# Patient Record
Sex: FEMALE | Race: BLACK OR AFRICAN AMERICAN | Employment: FULL TIME | URBAN - METROPOLITAN AREA
[De-identification: names, ages, dates, MRNs, and addresses within clinical notes are randomized per-mention and may not be internally consistent; named-entity substitution may affect disease eponyms.]

---

## 2017-05-08 ENCOUNTER — OFFICE VISIT (OUTPATIENT)
Dept: ORTHOPEDIC SURGERY | Age: 57
End: 2017-05-08

## 2017-05-08 VITALS
WEIGHT: 254.41 LBS | BODY MASS INDEX: 42.39 KG/M2 | DIASTOLIC BLOOD PRESSURE: 84 MMHG | HEART RATE: 78 BPM | SYSTOLIC BLOOD PRESSURE: 133 MMHG | HEIGHT: 65 IN

## 2017-05-08 DIAGNOSIS — S93.402A MILD ANKLE SPRAIN, LEFT, INITIAL ENCOUNTER: ICD-10-CM

## 2017-05-08 DIAGNOSIS — M25.572 ACUTE LEFT ANKLE PAIN: Primary | ICD-10-CM

## 2017-05-08 DIAGNOSIS — G57.82 NERVE ENTRAPMENT OF LOWER LIMB, LEFT: ICD-10-CM

## 2017-05-08 PROBLEM — G57.80 NERVE ENTRAPMENT OF LOWER LIMB: Status: ACTIVE | Noted: 2017-05-08

## 2017-05-08 PROBLEM — S93.409A MILD ANKLE SPRAIN: Status: ACTIVE | Noted: 2017-05-08

## 2017-05-08 PROCEDURE — 20550 NJX 1 TENDON SHEATH/LIGAMENT: CPT | Performed by: PODIATRIST

## 2017-05-08 PROCEDURE — 73610 X-RAY EXAM OF ANKLE: CPT | Performed by: PODIATRIST

## 2017-05-08 PROCEDURE — 99203 OFFICE O/P NEW LOW 30 MIN: CPT | Performed by: PODIATRIST

## 2017-05-08 RX ORDER — HYDROCODONE BITARTRATE AND ACETAMINOPHEN 5; 325 MG/1; MG/1
TABLET ORAL
Refills: 0 | COMMUNITY
Start: 2017-02-08 | End: 2017-09-06

## 2017-05-08 RX ORDER — DEXAMETHASONE 4 MG/1
TABLET ORAL
Refills: 0 | COMMUNITY
Start: 2017-02-15 | End: 2017-09-06

## 2017-05-08 RX ORDER — AMOXICILLIN 500 MG/1
CAPSULE ORAL
Refills: 0 | COMMUNITY
Start: 2017-02-15 | End: 2017-05-08 | Stop reason: ALTCHOICE

## 2017-06-08 ENCOUNTER — OFFICE VISIT (OUTPATIENT)
Dept: ORTHOPEDIC SURGERY | Age: 57
End: 2017-06-08

## 2017-06-08 VITALS — BODY MASS INDEX: 43.36 KG/M2 | WEIGHT: 254 LBS | HEIGHT: 64 IN

## 2017-06-08 DIAGNOSIS — G89.29 CHRONIC PAIN OF LEFT ANKLE: Primary | ICD-10-CM

## 2017-06-08 DIAGNOSIS — S93.402D MILD ANKLE SPRAIN, LEFT, SUBSEQUENT ENCOUNTER: ICD-10-CM

## 2017-06-08 DIAGNOSIS — M25.572 CHRONIC PAIN OF LEFT ANKLE: Primary | ICD-10-CM

## 2017-06-08 PROCEDURE — L1902 AFO ANKLE GAUNTLET PRE OTS: HCPCS | Performed by: PODIATRIST

## 2017-06-08 PROCEDURE — 99213 OFFICE O/P EST LOW 20 MIN: CPT | Performed by: PODIATRIST

## 2017-07-13 ENCOUNTER — OFFICE VISIT (OUTPATIENT)
Dept: ORTHOPEDIC SURGERY | Age: 57
End: 2017-07-13

## 2017-07-13 VITALS
HEIGHT: 64 IN | WEIGHT: 253.97 LBS | BODY MASS INDEX: 43.36 KG/M2 | SYSTOLIC BLOOD PRESSURE: 132 MMHG | HEART RATE: 88 BPM | DIASTOLIC BLOOD PRESSURE: 85 MMHG

## 2017-07-13 DIAGNOSIS — M25.372 ANKLE INSTABILITY, LEFT: ICD-10-CM

## 2017-07-13 DIAGNOSIS — M25.572 ACUTE LEFT ANKLE PAIN: ICD-10-CM

## 2017-07-13 DIAGNOSIS — M76.72 PERONEAL TENDINITIS, LEFT: ICD-10-CM

## 2017-07-13 DIAGNOSIS — S93.402D MILD ANKLE SPRAIN, LEFT, SUBSEQUENT ENCOUNTER: Primary | ICD-10-CM

## 2017-07-13 PROBLEM — M76.70 PERONEAL TENDINITIS: Status: ACTIVE | Noted: 2017-07-13

## 2017-07-13 PROCEDURE — 99213 OFFICE O/P EST LOW 20 MIN: CPT | Performed by: PODIATRIST

## 2017-07-31 ENCOUNTER — OFFICE VISIT (OUTPATIENT)
Dept: ORTHOPEDIC SURGERY | Age: 57
End: 2017-07-31

## 2017-07-31 VITALS
WEIGHT: 253.97 LBS | BODY MASS INDEX: 43.36 KG/M2 | HEIGHT: 64 IN | SYSTOLIC BLOOD PRESSURE: 132 MMHG | DIASTOLIC BLOOD PRESSURE: 88 MMHG | HEART RATE: 78 BPM

## 2017-07-31 DIAGNOSIS — S86.312D PERONEAL TENDON TEAR, LEFT, SUBSEQUENT ENCOUNTER: Primary | ICD-10-CM

## 2017-07-31 PROBLEM — S86.319A PERONEAL TENDON TEAR: Status: ACTIVE | Noted: 2017-07-31

## 2017-07-31 PROCEDURE — 99213 OFFICE O/P EST LOW 20 MIN: CPT | Performed by: PODIATRIST

## 2017-08-24 ENCOUNTER — OFFICE VISIT (OUTPATIENT)
Dept: INTERNAL MEDICINE | Age: 57
End: 2017-08-24

## 2017-08-24 VITALS
DIASTOLIC BLOOD PRESSURE: 90 MMHG | WEIGHT: 267.6 LBS | HEIGHT: 64 IN | BODY MASS INDEX: 45.68 KG/M2 | SYSTOLIC BLOOD PRESSURE: 160 MMHG

## 2017-08-24 DIAGNOSIS — J01.00 ACUTE NON-RECURRENT MAXILLARY SINUSITIS: Primary | ICD-10-CM

## 2017-08-24 DIAGNOSIS — H10.9 CONJUNCTIVITIS OF BOTH EYES, UNSPECIFIED CONJUNCTIVITIS TYPE: ICD-10-CM

## 2017-08-24 PROCEDURE — 99213 OFFICE O/P EST LOW 20 MIN: CPT | Performed by: INTERNAL MEDICINE

## 2017-08-24 RX ORDER — CEFUROXIME AXETIL 500 MG/1
500 TABLET ORAL 2 TIMES DAILY
Qty: 14 TABLET | Refills: 0 | Status: SHIPPED | OUTPATIENT
Start: 2017-08-24 | End: 2017-08-31

## 2017-08-29 ENCOUNTER — OFFICE VISIT (OUTPATIENT)
Dept: INTERNAL MEDICINE | Age: 57
End: 2017-08-29

## 2017-08-29 VITALS
SYSTOLIC BLOOD PRESSURE: 158 MMHG | HEIGHT: 65 IN | DIASTOLIC BLOOD PRESSURE: 82 MMHG | BODY MASS INDEX: 44.48 KG/M2 | WEIGHT: 267 LBS

## 2017-08-29 DIAGNOSIS — E66.01 MORBID OBESITY DUE TO EXCESS CALORIES (HCC): ICD-10-CM

## 2017-08-29 DIAGNOSIS — R03.0 ELEVATED BLOOD PRESSURE READING: ICD-10-CM

## 2017-08-29 DIAGNOSIS — S86.312A PERONEAL TENDON TEAR, LEFT, INITIAL ENCOUNTER: ICD-10-CM

## 2017-08-29 DIAGNOSIS — Z01.818 PREOP EXAM FOR INTERNAL MEDICINE: Primary | ICD-10-CM

## 2017-08-29 PROCEDURE — 99214 OFFICE O/P EST MOD 30 MIN: CPT | Performed by: INTERNAL MEDICINE

## 2017-08-29 ASSESSMENT — PATIENT HEALTH QUESTIONNAIRE - PHQ9
2. FEELING DOWN, DEPRESSED OR HOPELESS: 0
SUM OF ALL RESPONSES TO PHQ9 QUESTIONS 1 & 2: 0
1. LITTLE INTEREST OR PLEASURE IN DOING THINGS: 0
SUM OF ALL RESPONSES TO PHQ QUESTIONS 1-9: 0

## 2017-09-05 ENCOUNTER — TELEPHONE (OUTPATIENT)
Dept: ORTHOPEDIC SURGERY | Age: 57
End: 2017-09-05

## 2017-09-06 ENCOUNTER — TELEPHONE (OUTPATIENT)
Dept: INTERNAL MEDICINE | Age: 57
End: 2017-09-06

## 2017-09-08 ENCOUNTER — HOSPITAL ENCOUNTER (OUTPATIENT)
Dept: SURGERY | Age: 57
Discharge: HOME | End: 2017-09-08
Attending: PODIATRIST | Admitting: PODIATRIST

## 2017-09-08 VITALS
OXYGEN SATURATION: 94 % | TEMPERATURE: 97 F | DIASTOLIC BLOOD PRESSURE: 72 MMHG | HEART RATE: 71 BPM | RESPIRATION RATE: 18 BRPM | BODY MASS INDEX: 49 KG/M2 | WEIGHT: 287 LBS | SYSTOLIC BLOOD PRESSURE: 137 MMHG | HEIGHT: 64 IN

## 2017-09-08 RX ORDER — OXYCODONE HYDROCHLORIDE AND ACETAMINOPHEN 5; 325 MG/1; MG/1
2 TABLET ORAL PRN
Status: ACTIVE | OUTPATIENT
Start: 2017-09-08 | End: 2017-09-08

## 2017-09-08 RX ORDER — HYDRALAZINE HYDROCHLORIDE 20 MG/ML
5 INJECTION INTRAMUSCULAR; INTRAVENOUS EVERY 30 MIN PRN
Status: DISCONTINUED | OUTPATIENT
Start: 2017-09-08 | End: 2017-09-09 | Stop reason: HOSPADM

## 2017-09-08 RX ORDER — LIDOCAINE HYDROCHLORIDE 10 MG/ML
1 INJECTION, SOLUTION EPIDURAL; INFILTRATION; INTRACAUDAL; PERINEURAL
Status: ACTIVE | OUTPATIENT
Start: 2017-09-08 | End: 2017-09-08

## 2017-09-08 RX ORDER — MEPERIDINE HYDROCHLORIDE 50 MG/ML
12.5 INJECTION INTRAMUSCULAR; INTRAVENOUS; SUBCUTANEOUS EVERY 5 MIN PRN
Status: DISCONTINUED | OUTPATIENT
Start: 2017-09-08 | End: 2017-09-09 | Stop reason: HOSPADM

## 2017-09-08 RX ORDER — ONDANSETRON 2 MG/ML
4 INJECTION INTRAMUSCULAR; INTRAVENOUS EVERY 30 MIN PRN
Status: DISCONTINUED | OUTPATIENT
Start: 2017-09-08 | End: 2017-09-09 | Stop reason: HOSPADM

## 2017-09-08 RX ORDER — OXYCODONE HYDROCHLORIDE AND ACETAMINOPHEN 5; 325 MG/1; MG/1
1-2 TABLET ORAL EVERY 8 HOURS PRN
Qty: 30 TABLET | Refills: 0 | Status: SHIPPED | OUTPATIENT
Start: 2017-09-08 | End: 2017-11-30 | Stop reason: ALTCHOICE

## 2017-09-08 RX ORDER — SODIUM CHLORIDE, SODIUM LACTATE, POTASSIUM CHLORIDE, CALCIUM CHLORIDE 600; 310; 30; 20 MG/100ML; MG/100ML; MG/100ML; MG/100ML
INJECTION, SOLUTION INTRAVENOUS CONTINUOUS
Status: DISCONTINUED | OUTPATIENT
Start: 2017-09-08 | End: 2017-09-09 | Stop reason: HOSPADM

## 2017-09-08 RX ORDER — LABETALOL HYDROCHLORIDE 5 MG/ML
5 INJECTION, SOLUTION INTRAVENOUS
Status: DISCONTINUED | OUTPATIENT
Start: 2017-09-08 | End: 2017-09-09 | Stop reason: HOSPADM

## 2017-09-08 RX ORDER — SODIUM CHLORIDE 0.9 % (FLUSH) 0.9 %
10 SYRINGE (ML) INJECTION PRN
Status: DISCONTINUED | OUTPATIENT
Start: 2017-09-08 | End: 2017-09-09 | Stop reason: HOSPADM

## 2017-09-08 RX ORDER — DIPHENHYDRAMINE HYDROCHLORIDE 50 MG/ML
6.25 INJECTION INTRAMUSCULAR; INTRAVENOUS
Status: ACTIVE | OUTPATIENT
Start: 2017-09-08 | End: 2017-09-08

## 2017-09-08 RX ORDER — PROMETHAZINE HYDROCHLORIDE 25 MG/1
25 TABLET ORAL EVERY 6 HOURS PRN
Qty: 30 TABLET | Refills: 0 | Status: SHIPPED | OUTPATIENT
Start: 2017-09-08 | End: 2017-09-15

## 2017-09-08 RX ORDER — SODIUM CHLORIDE 0.9 % (FLUSH) 0.9 %
10 SYRINGE (ML) INJECTION EVERY 12 HOURS SCHEDULED
Status: DISCONTINUED | OUTPATIENT
Start: 2017-09-08 | End: 2017-09-09 | Stop reason: HOSPADM

## 2017-09-08 RX ORDER — OXYCODONE HYDROCHLORIDE AND ACETAMINOPHEN 5; 325 MG/1; MG/1
1 TABLET ORAL PRN
Status: ACTIVE | OUTPATIENT
Start: 2017-09-08 | End: 2017-09-08

## 2017-09-08 RX ADMIN — SODIUM CHLORIDE, SODIUM LACTATE, POTASSIUM CHLORIDE, CALCIUM CHLORIDE: 600; 310; 30; 20 INJECTION, SOLUTION INTRAVENOUS at 07:44

## 2017-09-08 RX ADMIN — Medication 0.5 MG: at 13:22

## 2017-09-08 RX ADMIN — Medication 0.5 MG: at 13:15

## 2017-09-08 ASSESSMENT — PAIN SCALES - GENERAL
PAINLEVEL_OUTOF10: 6
PAINLEVEL_OUTOF10: 7
PAINLEVEL_OUTOF10: 5
PAINLEVEL_OUTOF10: 6
PAINLEVEL_OUTOF10: 6
PAINLEVEL_OUTOF10: 5
PAINLEVEL_OUTOF10: 5

## 2017-09-08 ASSESSMENT — PAIN - FUNCTIONAL ASSESSMENT
PAIN_FUNCTIONAL_ASSESSMENT: 0-10
PAIN_FUNCTIONAL_ASSESSMENT: 0-10

## 2017-09-14 ENCOUNTER — OFFICE VISIT (OUTPATIENT)
Dept: ORTHOPEDIC SURGERY | Age: 57
End: 2017-09-14

## 2017-09-14 VITALS
WEIGHT: 287.04 LBS | BODY MASS INDEX: 49 KG/M2 | SYSTOLIC BLOOD PRESSURE: 135 MMHG | HEIGHT: 64 IN | HEART RATE: 81 BPM | DIASTOLIC BLOOD PRESSURE: 75 MMHG

## 2017-09-14 DIAGNOSIS — Z09 POSTOP CHECK: Primary | ICD-10-CM

## 2017-09-14 PROCEDURE — 29405 APPL SHORT LEG CAST: CPT | Performed by: PODIATRIST

## 2017-09-14 PROCEDURE — 99024 POSTOP FOLLOW-UP VISIT: CPT | Performed by: PODIATRIST

## 2017-09-14 RX ORDER — TOBRAMYCIN AND DEXAMETHASONE 3; 1 MG/ML; MG/ML
SUSPENSION/ DROPS OPHTHALMIC
Refills: 0 | COMMUNITY
Start: 2017-08-22 | End: 2019-07-10

## 2017-09-22 RX ORDER — HYDROCODONE BITARTRATE AND ACETAMINOPHEN 5; 325 MG/1; MG/1
1-2 TABLET ORAL EVERY 8 HOURS PRN
Qty: 30 TABLET | Refills: 0 | Status: SHIPPED | OUTPATIENT
Start: 2017-09-22 | End: 2017-11-30 | Stop reason: ALTCHOICE

## 2017-10-05 ENCOUNTER — OFFICE VISIT (OUTPATIENT)
Dept: ORTHOPEDIC SURGERY | Age: 57
End: 2017-10-05

## 2017-10-05 VITALS
HEIGHT: 64 IN | HEART RATE: 88 BPM | BODY MASS INDEX: 49 KG/M2 | SYSTOLIC BLOOD PRESSURE: 136 MMHG | WEIGHT: 287.04 LBS | DIASTOLIC BLOOD PRESSURE: 87 MMHG

## 2017-10-05 DIAGNOSIS — S86.312D PERONEAL TENDON TEAR, LEFT, SUBSEQUENT ENCOUNTER: ICD-10-CM

## 2017-10-05 DIAGNOSIS — Z09 POSTOP CHECK: Primary | ICD-10-CM

## 2017-10-05 PROCEDURE — 99024 POSTOP FOLLOW-UP VISIT: CPT | Performed by: PODIATRIST

## 2017-10-05 PROCEDURE — L4361 PNEUMA/VAC WALK BOOT PRE OTS: HCPCS | Performed by: PODIATRIST

## 2017-10-05 NOTE — MR AVS SNAPSHOT
Learn more at: [email or web address]             Medications and Orders      Your Current Medications Are              HYDROcodone-acetaminophen (NORCO) 5-325 MG per tablet Take 1-2 tablets by mouth every 8 hours as needed for Pain .    tobramycin-dexamethasone (TOBRADEX) 0.3-0.1 % ophthalmic suspension INSTILL 1 GTT INTO OD TID    oxyCODONE-acetaminophen (PERCOCET) 5-325 MG per tablet Take 1-2 tablets by mouth every 8 hours as needed for Pain . ibuprofen (ADVIL;MOTRIN) 200 MG tablet Take 200 mg by mouth as needed for Pain      Allergies           No Known Allergies      We Ordered/Performed the following           Amb External Referral To Physical Therapy     Scheduling Instructions:    Physical therapy prescription for S/P peroneus brevis and peroneus longus tendon repair, left ankle x 4 weeks    Please promote full function postop    Patient is allowed to bear weight as tolerated in the boot. Modalities of choice to include electrical stimulation    Range of motion exercises progressing to strengthening exercises and proprioceptive retraining    3 times a week for 3 weeks. Comments: The patient can be scheduled with any member of the group, including the provider with the first available appointments.           Additional Information        Basic Information     Date Of Birth Sex Race Ethnicity Preferred Language    1960 Female Black Non-/Non  English      Problem List as of 10/5/2017  Date Reviewed: 10/5/2017                Postop check    Conjunctivitis    Peroneal tendon tear    Peroneal tendinitis    Mild ankle sprain    Nerve entrapment of lower limb    Elevated blood pressure reading    Knee pain    Obesity    Acute bronchitis    Routine general medical examination at a health care facility      Your Goals as of 10/5/2017 at 10:02 AM              Today    9/14/17 9/8/17       Blood Pressure    Blood Pressure < 140/90   136/87  135/75  137/72 Notes    Barriers to success: none  Plan for overcoming my barriers: N/A     Confidence: 1/10  Date goal set: 11/2/16  Date goal attained:           Immunizations as of 10/5/2017     Name Date    Influenza Virus Vaccine 10/26/2010    Influenza, Intradermal, Quadrivalent, Preservative Free 11/2/2016    Tdap (Boostrix, Adacel) 7/24/2014      Preventive Care        Date Due    Hepatitis C screening is recommended for all adults regardless of risk factors born between [facility] at least once (lifetime) who have never been tested. 1960    HIV screening is recommended for all people regardless of risk factors  aged 15-65 years at least once (lifetime) who have never been HIV tested. 8/25/1975    Diabetes Screening 8/25/2000    Colonoscopy 8/25/2010    Pap Smear 7/24/2015    Mammograms are recommended every 2 years for low/average risk patients aged 48 - 69, and every year for high risk patients per updated national guidelines. However these guidelines can be individualized by your provider. 6/16/2017    Yearly Flu Vaccine (1) 10/26/2017 (Originally 9/1/2017)    Cholesterol Screening 7/24/2019    Tetanus Combination Vaccine (2 - Td) 7/24/2024            Logic[employer]reet Signup           Our records indicate that you have an active [employer] account. You can view your After Visit Summary by going to [email or web address] and logging in with your [employer] username and password. If you don't have a [employer] username and password but a parent or guardian has access to your record, the parent or guardian should login with their own [employer] username and password and access your record to view the After Visit Summary. Additional Information  If you have questions, please contact the physician practice where you receive care. Remember, [employer] is NOT to be used for urgent needs. For medical emergencies, dial 911. For questions regarding your [employer] account call [phone].  If you

## 2017-10-05 NOTE — PROGRESS NOTES
4 week postop check. She denies having any pain. She has moderate edema to the left lower leg ankle and foot. No signs of infection are present. She does have the ability to heaven against resistance. Status post peroneus brevis and peroneus longus tendon repair left ankle ×4 weeks    I prescribed physical therapy. A high tide walker was applied to the patient's left lower extremity. It was recommended that the patient use the boot even at night for sleeping. All weightbearing activity is to be performed in the boot. .  Overall activity is to be decreased. I'll see her back in 4 weeks.

## 2017-11-02 ENCOUNTER — OFFICE VISIT (OUTPATIENT)
Dept: ORTHOPEDIC SURGERY | Age: 57
End: 2017-11-02

## 2017-11-02 VITALS
WEIGHT: 287.04 LBS | HEIGHT: 64 IN | HEART RATE: 74 BPM | SYSTOLIC BLOOD PRESSURE: 138 MMHG | BODY MASS INDEX: 49 KG/M2 | DIASTOLIC BLOOD PRESSURE: 85 MMHG

## 2017-11-02 DIAGNOSIS — Z09 POSTOP CHECK: ICD-10-CM

## 2017-11-02 DIAGNOSIS — S86.312S TEAR OF PERONEAL TENDON, LEFT, SEQUELA: Primary | ICD-10-CM

## 2017-11-02 PROBLEM — M17.12 PRIMARY OSTEOARTHRITIS OF LEFT KNEE: Status: ACTIVE | Noted: 2017-08-14

## 2017-11-02 PROCEDURE — 99024 POSTOP FOLLOW-UP VISIT: CPT | Performed by: PODIATRIST

## 2017-11-02 PROCEDURE — L3040 FT ARCH SUPRT PREMOLD LONGIT: HCPCS | Performed by: PODIATRIST

## 2017-11-02 RX ORDER — IBUPROFEN 200 MG
200 TABLET ORAL
COMMUNITY
End: 2019-07-10

## 2017-11-02 NOTE — PROGRESS NOTES
8 week postop check. She states that she is having just a little pain to the outside part of the ankle after physical therapy. She denies any other complaints. She is moderate edema to the anterior and lateral aspects of the ankle. No signs of infection are noted. She has mild palpable tenderness over the ATF ligament. She has minimal palpable tenderness over the incision and the peroneal.    Status post peroneal tendon reconstruction left ankle ×8 weeks    I would like her to come out of the boot and back into her brace as tolerated. She will continue with more physical therapy. A set of PowerStep foot orthotics was dispensed. We discussed the appropriate use of them. I advised the patient to use them full time in a supportive shoe that will fit them. I will see her back in 4 weeks. Procedures    Powerstep Protech Full Length Insert     Patient was prescribed Powerstep Protech Full Length Inserts. The bilateral feet will require stabilization / support from this semi-rigid / rigid orthosis to improve their function. The orthosis will assist in protecting the affected area, provide functional support and facilitate healing. The patient was educated and fit by a healthcare professional with expert knowledge and specialization in brace application while under the direct supervision of the treating physician. Verbal and written instructions for the use of and application of this item were provided. They were instructed to contact the office immediately should the brace result in increased pain, decreased sensation, increased swelling or worsening of the condition.

## 2017-11-30 ENCOUNTER — OFFICE VISIT (OUTPATIENT)
Dept: ORTHOPEDIC SURGERY | Age: 57
End: 2017-11-30

## 2017-11-30 VITALS
HEART RATE: 74 BPM | WEIGHT: 287.04 LBS | HEIGHT: 64 IN | SYSTOLIC BLOOD PRESSURE: 133 MMHG | BODY MASS INDEX: 49 KG/M2 | DIASTOLIC BLOOD PRESSURE: 88 MMHG

## 2017-11-30 DIAGNOSIS — Z09 POSTOP CHECK: Primary | ICD-10-CM

## 2017-11-30 PROCEDURE — 99024 POSTOP FOLLOW-UP VISIT: CPT | Performed by: PODIATRIST

## 2017-11-30 NOTE — PROGRESS NOTES
12 week postop check. She states that she is doing well and is not having a lot of pain during the day. At night she will have pain at times. She feels as if she is walking more on the outside part of her ankle and foot. She has minimal palpable tenderness over the incision and the peroneal tendons. She has excellent strength with eversion and plantarflexion of the foot. Single limb stance strength is still significantly weak as compared to the right. Status post peroneal tendon reconstruction left ankle ×3 months    I think that once she restores strength/improves it, she will feel less of this subjective complaints of \"rolling to the outside\" of her ankle. She will discontinue formal physical therapy and continue with home exercises. She will also continue with the brace until she achieves full strength. I will see her back as needed.

## 2018-04-12 PROBLEM — Z09 POSTOP CHECK: Status: RESOLVED | Noted: 2017-09-14 | Resolved: 2018-04-12

## 2018-08-07 ENCOUNTER — OFFICE VISIT (OUTPATIENT)
Dept: INTERNAL MEDICINE | Age: 58
End: 2018-08-07

## 2018-08-07 VITALS
HEIGHT: 64 IN | WEIGHT: 252 LBS | SYSTOLIC BLOOD PRESSURE: 138 MMHG | DIASTOLIC BLOOD PRESSURE: 78 MMHG | BODY MASS INDEX: 43.02 KG/M2

## 2018-08-07 DIAGNOSIS — J01.01 ACUTE RECURRENT MAXILLARY SINUSITIS: Primary | ICD-10-CM

## 2018-08-07 PROCEDURE — 99213 OFFICE O/P EST LOW 20 MIN: CPT | Performed by: INTERNAL MEDICINE

## 2018-08-07 RX ORDER — AMOXICILLIN 500 MG/1
500 CAPSULE ORAL 3 TIMES DAILY
Qty: 30 CAPSULE | Refills: 0 | Status: SHIPPED | OUTPATIENT
Start: 2018-08-07 | End: 2018-08-17

## 2018-08-07 ASSESSMENT — ENCOUNTER SYMPTOMS
DIARRHEA: 0
COUGH: 0
RHINORRHEA: 1
SINUS PAIN: 1
CONSTIPATION: 0
SHORTNESS OF BREATH: 0
SINUS PRESSURE: 1
ABDOMINAL PAIN: 0
CHEST TIGHTNESS: 0

## 2018-09-14 ENCOUNTER — OFFICE VISIT (OUTPATIENT)
Dept: INTERNAL MEDICINE | Age: 58
End: 2018-09-14

## 2018-09-14 VITALS
WEIGHT: 251 LBS | BODY MASS INDEX: 42.85 KG/M2 | SYSTOLIC BLOOD PRESSURE: 144 MMHG | DIASTOLIC BLOOD PRESSURE: 70 MMHG | HEIGHT: 64 IN

## 2018-09-14 DIAGNOSIS — M79.18 PAIN OF MUSCLE OF ABDOMEN: ICD-10-CM

## 2018-09-14 DIAGNOSIS — Z23 NEED FOR INFLUENZA VACCINATION: Primary | ICD-10-CM

## 2018-09-14 DIAGNOSIS — Z12.11 ENCOUNTER FOR SCREENING COLONOSCOPY: ICD-10-CM

## 2018-09-14 PROCEDURE — 99213 OFFICE O/P EST LOW 20 MIN: CPT | Performed by: INTERNAL MEDICINE

## 2018-09-14 PROCEDURE — 90686 IIV4 VACC NO PRSV 0.5 ML IM: CPT | Performed by: INTERNAL MEDICINE

## 2018-09-14 PROCEDURE — 90471 IMMUNIZATION ADMIN: CPT | Performed by: INTERNAL MEDICINE

## 2018-09-14 RX ORDER — CYCLOBENZAPRINE HCL 10 MG
10 TABLET ORAL 3 TIMES DAILY PRN
Qty: 20 TABLET | Refills: 1 | Status: SHIPPED | OUTPATIENT
Start: 2018-09-14 | End: 2019-07-10

## 2018-09-14 ASSESSMENT — PATIENT HEALTH QUESTIONNAIRE - PHQ9
SUM OF ALL RESPONSES TO PHQ QUESTIONS 1-9: 0
SUM OF ALL RESPONSES TO PHQ9 QUESTIONS 1 & 2: 0
2. FEELING DOWN, DEPRESSED OR HOPELESS: 0
1. LITTLE INTEREST OR PLEASURE IN DOING THINGS: 0
SUM OF ALL RESPONSES TO PHQ QUESTIONS 1-9: 0

## 2018-09-14 ASSESSMENT — ENCOUNTER SYMPTOMS
ABDOMINAL PAIN: 0
SHORTNESS OF BREATH: 1

## 2019-06-10 ENCOUNTER — TELEPHONE (OUTPATIENT)
Dept: INTERNAL MEDICINE CLINIC | Age: 59
End: 2019-06-10

## 2019-06-10 DIAGNOSIS — Z00.00 ROUTINE GENERAL MEDICAL EXAMINATION AT A HEALTH CARE FACILITY: Primary | ICD-10-CM

## 2019-06-10 DIAGNOSIS — Z13.220 SCREENING CHOLESTEROL LEVEL: ICD-10-CM

## 2019-06-10 DIAGNOSIS — Z13.29 SCREENING FOR THYROID DISORDER: ICD-10-CM

## 2019-06-10 NOTE — TELEPHONE ENCOUNTER
Pt would like lab orders placed prior to scheduled visit 7/10/19 and would like a call once orders placed

## 2019-07-10 ENCOUNTER — OFFICE VISIT (OUTPATIENT)
Dept: INTERNAL MEDICINE CLINIC | Age: 59
End: 2019-07-10
Payer: COMMERCIAL

## 2019-07-10 VITALS
DIASTOLIC BLOOD PRESSURE: 82 MMHG | BODY MASS INDEX: 43.77 KG/M2 | WEIGHT: 256.4 LBS | SYSTOLIC BLOOD PRESSURE: 132 MMHG | HEIGHT: 64 IN

## 2019-07-10 DIAGNOSIS — Z00.00 ROUTINE GENERAL MEDICAL EXAMINATION AT A HEALTH CARE FACILITY: Primary | ICD-10-CM

## 2019-07-10 DIAGNOSIS — Z13.29 SCREENING FOR THYROID DISORDER: ICD-10-CM

## 2019-07-10 DIAGNOSIS — Z12.39 SCREENING FOR BREAST CANCER: ICD-10-CM

## 2019-07-10 DIAGNOSIS — Z13.220 SCREENING CHOLESTEROL LEVEL: ICD-10-CM

## 2019-07-10 DIAGNOSIS — Z00.00 ROUTINE GENERAL MEDICAL EXAMINATION AT A HEALTH CARE FACILITY: ICD-10-CM

## 2019-07-10 LAB
A/G RATIO: 1.6 (ref 1.1–2.2)
ALBUMIN SERPL-MCNC: 4.2 G/DL (ref 3.4–5)
ALP BLD-CCNC: 81 U/L (ref 40–129)
ALT SERPL-CCNC: 18 U/L (ref 10–40)
ANION GAP SERPL CALCULATED.3IONS-SCNC: 12 MMOL/L (ref 3–16)
AST SERPL-CCNC: 24 U/L (ref 15–37)
BASOPHILS ABSOLUTE: 0 K/UL (ref 0–0.2)
BASOPHILS RELATIVE PERCENT: 0.9 %
BILIRUB SERPL-MCNC: 0.5 MG/DL (ref 0–1)
BUN BLDV-MCNC: 13 MG/DL (ref 7–20)
CALCIUM SERPL-MCNC: 9.8 MG/DL (ref 8.3–10.6)
CHLORIDE BLD-SCNC: 100 MMOL/L (ref 99–110)
CHOLESTEROL, TOTAL: 236 MG/DL (ref 0–199)
CO2: 26 MMOL/L (ref 21–32)
CREAT SERPL-MCNC: 0.8 MG/DL (ref 0.6–1.1)
EOSINOPHILS ABSOLUTE: 0.1 K/UL (ref 0–0.6)
EOSINOPHILS RELATIVE PERCENT: 2.3 %
GFR AFRICAN AMERICAN: >60
GFR NON-AFRICAN AMERICAN: >60
GLOBULIN: 2.7 G/DL
GLUCOSE BLD-MCNC: 96 MG/DL (ref 70–99)
HCT VFR BLD CALC: 39.9 % (ref 36–48)
HDLC SERPL-MCNC: 57 MG/DL (ref 40–60)
HEMOGLOBIN: 13.1 G/DL (ref 12–16)
LDL CHOLESTEROL CALCULATED: 162 MG/DL
LYMPHOCYTES ABSOLUTE: 1.5 K/UL (ref 1–5.1)
LYMPHOCYTES RELATIVE PERCENT: 35.8 %
MCH RBC QN AUTO: 26.8 PG (ref 26–34)
MCHC RBC AUTO-ENTMCNC: 32.9 G/DL (ref 31–36)
MCV RBC AUTO: 81.3 FL (ref 80–100)
MONOCYTES ABSOLUTE: 0.5 K/UL (ref 0–1.3)
MONOCYTES RELATIVE PERCENT: 11.8 %
NEUTROPHILS ABSOLUTE: 2.1 K/UL (ref 1.7–7.7)
NEUTROPHILS RELATIVE PERCENT: 49.2 %
PDW BLD-RTO: 14.9 % (ref 12.4–15.4)
PLATELET # BLD: 206 K/UL (ref 135–450)
PMV BLD AUTO: 8.1 FL (ref 5–10.5)
POTASSIUM SERPL-SCNC: 4.1 MMOL/L (ref 3.5–5.1)
RBC # BLD: 4.91 M/UL (ref 4–5.2)
SODIUM BLD-SCNC: 138 MMOL/L (ref 136–145)
TOTAL PROTEIN: 6.9 G/DL (ref 6.4–8.2)
TRIGL SERPL-MCNC: 84 MG/DL (ref 0–150)
TSH REFLEX: 1.09 UIU/ML (ref 0.27–4.2)
VLDLC SERPL CALC-MCNC: 17 MG/DL
WBC # BLD: 4.3 K/UL (ref 4–11)

## 2019-07-10 PROCEDURE — 99396 PREV VISIT EST AGE 40-64: CPT | Performed by: INTERNAL MEDICINE

## 2019-07-10 ASSESSMENT — PATIENT HEALTH QUESTIONNAIRE - PHQ9
SUM OF ALL RESPONSES TO PHQ QUESTIONS 1-9: 0
2. FEELING DOWN, DEPRESSED OR HOPELESS: 0
SUM OF ALL RESPONSES TO PHQ QUESTIONS 1-9: 0
SUM OF ALL RESPONSES TO PHQ9 QUESTIONS 1 & 2: 0
1. LITTLE INTEREST OR PLEASURE IN DOING THINGS: 0

## 2019-07-10 ASSESSMENT — ENCOUNTER SYMPTOMS: SHORTNESS OF BREATH: 0

## 2019-07-10 NOTE — PATIENT INSTRUCTIONS
Check to see if cologuard is covered by your insurance. If not then we can do FIT testing for colon cancer screening.

## 2021-04-16 DIAGNOSIS — Z12.11 SCREENING FOR COLON CANCER: ICD-10-CM

## 2021-04-16 DIAGNOSIS — Z12.31 ENCOUNTER FOR SCREENING MAMMOGRAM FOR BREAST CANCER: Primary | ICD-10-CM

## 2021-04-28 ENCOUNTER — TELEPHONE (OUTPATIENT)
Dept: INTERNAL MEDICINE CLINIC | Age: 61
End: 2021-04-28

## 2021-04-28 DIAGNOSIS — R92.8 ABNORMAL MAMMOGRAM OF LEFT BREAST: Primary | ICD-10-CM

## 2021-04-28 NOTE — TELEPHONE ENCOUNTER
Bonny with [address] [place] [address] called stating that she needs clarification on the mammogram order. Please call to advise.

## 2021-05-06 ENCOUNTER — HOSPITAL ENCOUNTER (OUTPATIENT)
Dept: WOMENS IMAGING | Age: 61
Discharge: HOME | End: 2021-05-06
Payer: COMMERCIAL

## 2021-05-06 DIAGNOSIS — Z12.31 VISIT FOR SCREENING MAMMOGRAM: ICD-10-CM

## 2021-05-06 PROCEDURE — 77067 SCR MAMMO BI INCL CAD: CPT

## 2022-12-05 ENCOUNTER — TELEPHONE (OUTPATIENT)
Dept: INTERNAL MEDICINE CLINIC | Age: 62
End: 2022-12-05

## 2022-12-05 NOTE — TELEPHONE ENCOUNTER
----- Message from Kirk Guerrier sent at 12/5/2022 10:16 AM EST -----  Subject: Message to Provider    QUESTIONS  Information for Provider? patient having some cold symptoms-she was   wondering if she should make an appt or just take over the counter   meds-whole family getting over same symptoms  ---------------------------------------------------------------------------  --------------  [address]  [phone]; OK to leave message on voicemail  ---------------------------------------------------------------------------  --------------  SCRIPT ANSWERS  Relationship to Patient?  Self

## 2022-12-05 NOTE — TELEPHONE ENCOUNTER
Pt took two Covid test one yesterday and the day before.  Both are negative, Pt has Head congestion, and wheezing       [facility] [address] [place] [address] Leonid uG [address] - P [phone] - f [phone]        Please advise

## 2023-07-07 ENCOUNTER — OFFICE VISIT (OUTPATIENT)
Dept: INTERNAL MEDICINE CLINIC | Age: 63
End: 2023-07-07
Payer: COMMERCIAL

## 2023-07-07 VITALS
BODY MASS INDEX: 45.45 KG/M2 | WEIGHT: 266.2 LBS | HEIGHT: 64 IN | TEMPERATURE: 98.2 F | HEART RATE: 76 BPM | RESPIRATION RATE: 18 BRPM | SYSTOLIC BLOOD PRESSURE: 142 MMHG | DIASTOLIC BLOOD PRESSURE: 100 MMHG | OXYGEN SATURATION: 98 %

## 2023-07-07 DIAGNOSIS — Z13.1 SCREENING FOR DIABETES MELLITUS: ICD-10-CM

## 2023-07-07 DIAGNOSIS — Z13.220 SCREENING FOR HYPERLIPIDEMIA: ICD-10-CM

## 2023-07-07 DIAGNOSIS — Z23 NEED FOR SHINGLES VACCINE: ICD-10-CM

## 2023-07-07 DIAGNOSIS — R60.0 PEDAL EDEMA: ICD-10-CM

## 2023-07-07 DIAGNOSIS — Z00.00 WELL ADULT EXAM: Primary | ICD-10-CM

## 2023-07-07 DIAGNOSIS — R41.3 MEMORY LOSS OF UNKNOWN CAUSE: ICD-10-CM

## 2023-07-07 DIAGNOSIS — R60.0 PEDAL EDEMA: Primary | ICD-10-CM

## 2023-07-07 LAB
ALBUMIN SERPL-MCNC: 4.2 G/DL (ref 3.4–5)
ALBUMIN/GLOB SERPL: 1.4 {RATIO} (ref 1.1–2.2)
ALP SERPL-CCNC: 80 U/L (ref 40–129)
ALT SERPL-CCNC: 15 U/L (ref 10–40)
ANION GAP SERPL CALCULATED.3IONS-SCNC: 9 MMOL/L (ref 3–16)
AST SERPL-CCNC: 19 U/L (ref 15–37)
BASOPHILS # BLD: 0 K/UL (ref 0–0.2)
BASOPHILS NFR BLD: 0.5 %
BILIRUB SERPL-MCNC: 0.4 MG/DL (ref 0–1)
BUN SERPL-MCNC: 11 MG/DL (ref 7–20)
CALCIUM SERPL-MCNC: 9.5 MG/DL (ref 8.3–10.6)
CHLORIDE SERPL-SCNC: 104 MMOL/L (ref 99–110)
CHOLEST SERPL-MCNC: 270 MG/DL (ref 0–199)
CO2 SERPL-SCNC: 27 MMOL/L (ref 21–32)
CREAT SERPL-MCNC: 0.7 MG/DL (ref 0.6–1.2)
CRP SERPL-MCNC: 6.2 MG/L (ref 0–5.1)
DEPRECATED RDW RBC AUTO: 15.8 % (ref 12.4–15.4)
EOSINOPHIL # BLD: 0.1 K/UL (ref 0–0.6)
EOSINOPHIL NFR BLD: 1.2 %
ERYTHROCYTE [SEDIMENTATION RATE] IN BLOOD BY WESTERGREN METHOD: 50 MM/HR (ref 0–30)
FOLATE SERPL-MCNC: 16.52 NG/ML (ref 4.78–24.2)
GFR SERPLBLD CREATININE-BSD FMLA CKD-EPI: >60 ML/MIN/{1.73_M2}
GLUCOSE SERPL-MCNC: 90 MG/DL (ref 70–99)
HCT VFR BLD AUTO: 39.3 % (ref 36–48)
HDLC SERPL-MCNC: 53 MG/DL (ref 40–60)
HGB BLD-MCNC: 13.2 G/DL (ref 12–16)
LDLC SERPL CALC-MCNC: 196 MG/DL
LYMPHOCYTES # BLD: 1.7 K/UL (ref 1–5.1)
LYMPHOCYTES NFR BLD: 33.5 %
MCH RBC QN AUTO: 26.6 PG (ref 26–34)
MCHC RBC AUTO-ENTMCNC: 33.6 G/DL (ref 31–36)
MCV RBC AUTO: 79.2 FL (ref 80–100)
MONOCYTES # BLD: 0.5 K/UL (ref 0–1.3)
MONOCYTES NFR BLD: 10.8 %
NEUTROPHILS # BLD: 2.7 K/UL (ref 1.7–7.7)
NEUTROPHILS NFR BLD: 54 %
PLATELET # BLD AUTO: 195 K/UL (ref 135–450)
PMV BLD AUTO: 8.5 FL (ref 5–10.5)
POTASSIUM SERPL-SCNC: 4.1 MMOL/L (ref 3.5–5.1)
PROT SERPL-MCNC: 7.2 G/DL (ref 6.4–8.2)
RBC # BLD AUTO: 4.96 M/UL (ref 4–5.2)
SODIUM SERPL-SCNC: 140 MMOL/L (ref 136–145)
TRIGL SERPL-MCNC: 103 MG/DL (ref 0–150)
TSH SERPL DL<=0.005 MIU/L-ACNC: 0.86 UIU/ML (ref 0.27–4.2)
VIT B12 SERPL-MCNC: 752 PG/ML (ref 211–911)
VLDLC SERPL CALC-MCNC: 21 MG/DL
WBC # BLD AUTO: 5.1 K/UL (ref 4–11)

## 2023-07-07 PROCEDURE — 99396 PREV VISIT EST AGE 40-64: CPT | Performed by: INTERNAL MEDICINE

## 2023-07-07 PROCEDURE — 90750 HZV VACC RECOMBINANT IM: CPT | Performed by: INTERNAL MEDICINE

## 2023-07-07 PROCEDURE — 90471 IMMUNIZATION ADMIN: CPT | Performed by: INTERNAL MEDICINE

## 2023-07-07 ASSESSMENT — ENCOUNTER SYMPTOMS
COUGH: 0
ABDOMINAL DISTENTION: 0
EYE REDNESS: 0
CHOKING: 0
SINUS PRESSURE: 0
ABDOMINAL PAIN: 0

## 2023-07-07 ASSESSMENT — PATIENT HEALTH QUESTIONNAIRE - PHQ9
7. TROUBLE CONCENTRATING ON THINGS, SUCH AS READING THE NEWSPAPER OR WATCHING TELEVISION: 0
5. POOR APPETITE OR OVEREATING: 1
8. MOVING OR SPEAKING SO SLOWLY THAT OTHER PEOPLE COULD HAVE NOTICED. OR THE OPPOSITE, BEING SO FIGETY OR RESTLESS THAT YOU HAVE BEEN MOVING AROUND A LOT MORE THAN USUAL: 0
SUM OF ALL RESPONSES TO PHQ9 QUESTIONS 1 & 2: 0
6. FEELING BAD ABOUT YOURSELF - OR THAT YOU ARE A FAILURE OR HAVE LET YOURSELF OR YOUR FAMILY DOWN: 0
1. LITTLE INTEREST OR PLEASURE IN DOING THINGS: 0
SUM OF ALL RESPONSES TO PHQ QUESTIONS 1-9: 3
SUM OF ALL RESPONSES TO PHQ QUESTIONS 1-9: 3
4. FEELING TIRED OR HAVING LITTLE ENERGY: 1
SUM OF ALL RESPONSES TO PHQ QUESTIONS 1-9: 3
3. TROUBLE FALLING OR STAYING ASLEEP: 1
9. THOUGHTS THAT YOU WOULD BE BETTER OFF DEAD, OR OF HURTING YOURSELF: 0
2. FEELING DOWN, DEPRESSED OR HOPELESS: 0
10. IF YOU CHECKED OFF ANY PROBLEMS, HOW DIFFICULT HAVE THESE PROBLEMS MADE IT FOR YOU TO DO YOUR WORK, TAKE CARE OF THINGS AT HOME, OR GET ALONG WITH OTHER PEOPLE: 0
SUM OF ALL RESPONSES TO PHQ QUESTIONS 1-9: 3

## 2023-07-07 ASSESSMENT — ANXIETY QUESTIONNAIRES
3. WORRYING TOO MUCH ABOUT DIFFERENT THINGS: 0
5. BEING SO RESTLESS THAT IT IS HARD TO SIT STILL: 0
GAD7 TOTAL SCORE: 0
1. FEELING NERVOUS, ANXIOUS, OR ON EDGE: 0
7. FEELING AFRAID AS IF SOMETHING AWFUL MIGHT HAPPEN: 0
4. TROUBLE RELAXING: 0
2. NOT BEING ABLE TO STOP OR CONTROL WORRYING: 0
IF YOU CHECKED OFF ANY PROBLEMS ON THIS QUESTIONNAIRE, HOW DIFFICULT HAVE THESE PROBLEMS MADE IT FOR YOU TO DO YOUR WORK, TAKE CARE OF THINGS AT HOME, OR GET ALONG WITH OTHER PEOPLE: NOT DIFFICULT AT ALL
6. BECOMING EASILY ANNOYED OR IRRITABLE: 0

## 2023-07-07 NOTE — PROGRESS NOTES
2023    Sam Valdes (:  1960) is a 58 y.o. female, here for a preventive medicine evaluation. Patient is transferring from another provider. She is struggling with her weight but is currently in weight watchers. She has lost a lot of weight. She has borderline blood pressure and would like to treat with diet. She would  Also like to try weygovy. Patient Active Problem List   Diagnosis    Obesity    Acute bronchitis    Knee pain    Elevated blood pressure reading    Mild ankle sprain    Nerve entrapment of lower limb    Peroneal tendinitis    Peroneal tendon tear    Conjunctivitis    Primary osteoarthritis of left knee   Patient has not been to the dentist in the past year. Patient does not text and drive    Patient wears seatbelt when driving  Review of Systems   Constitutional:  Negative for diaphoresis and fatigue. HENT:  Negative for ear discharge, ear pain, sinus pressure and sneezing. Eyes:  Negative for redness. Respiratory:  Negative for cough and choking. Cardiovascular:  Negative for chest pain and leg swelling. Gastrointestinal:  Negative for abdominal distention and abdominal pain. Prior to Visit Medications    Not on File        No Known Allergies    Past Medical History:   Diagnosis Date    Arthritis     left knee    Obesity     Primary osteoarthritis of left knee 2017       Past Surgical History:   Procedure Laterality Date     SECTION      x2    EYE SURGERY Bilateral     glaucoma surgery    FOOT FRACTURE SURGERY Left 2017    Peroneus longus tendon repair, left. Peroneus brevis tendon repair, left. Peroneus brevis & longus tenodesis, left     KNEE ARTHROSCOPY Left     TUBAL LIGATION         Social History     Socioeconomic History    Marital status:      Spouse name: Not on file    Number of children: Not on file    Years of education: Not on file    Highest education level: Not on file   Occupational History    Not

## 2023-07-07 NOTE — PROGRESS NOTES
Central scheduling requesting order be changed to w/color. Order updated in [id number] [facility] 15.

## 2023-07-08 LAB
EST. AVERAGE GLUCOSE BLD GHB EST-MCNC: 125.5 MG/DL
HBA1C MFR BLD: 6 %

## 2023-07-14 ENCOUNTER — HOSPITAL ENCOUNTER (OUTPATIENT)
Dept: MAMMOGRAPHY | Age: 63
Discharge: HOME | End: 2023-07-14
Payer: COMMERCIAL

## 2023-07-14 VITALS — WEIGHT: 265 LBS | BODY MASS INDEX: 44.15 KG/M2 | HEIGHT: 65 IN

## 2023-07-14 DIAGNOSIS — Z12.31 VISIT FOR SCREENING MAMMOGRAM: ICD-10-CM

## 2023-07-14 PROCEDURE — 77067 SCR MAMMO BI INCL CAD: CPT

## 2023-08-01 ENCOUNTER — OFFICE VISIT (OUTPATIENT)
Dept: INTERNAL MEDICINE CLINIC | Age: 63
End: 2023-08-01
Payer: COMMERCIAL

## 2023-08-01 VITALS
BODY MASS INDEX: 43.87 KG/M2 | TEMPERATURE: 100 F | OXYGEN SATURATION: 95 % | DIASTOLIC BLOOD PRESSURE: 92 MMHG | WEIGHT: 259.6 LBS | SYSTOLIC BLOOD PRESSURE: 158 MMHG | HEART RATE: 101 BPM

## 2023-08-01 DIAGNOSIS — J18.9 PNEUMONIA OF LEFT LUNG DUE TO INFECTIOUS ORGANISM, UNSPECIFIED PART OF LUNG: Primary | ICD-10-CM

## 2023-08-01 DIAGNOSIS — R03.0 ELEVATED BP WITHOUT DIAGNOSIS OF HYPERTENSION: ICD-10-CM

## 2023-08-01 DIAGNOSIS — J30.89 ENVIRONMENTAL AND SEASONAL ALLERGIES: ICD-10-CM

## 2023-08-01 DIAGNOSIS — R05.1 ACUTE COUGH: ICD-10-CM

## 2023-08-01 PROCEDURE — 99214 OFFICE O/P EST MOD 30 MIN: CPT | Performed by: NURSE PRACTITIONER

## 2023-08-01 RX ORDER — CHLORTHALIDONE 25 MG/1
TABLET ORAL
Qty: 90 TABLET | Refills: 0 | Status: SHIPPED | OUTPATIENT
Start: 2023-08-01 | End: 2023-08-01 | Stop reason: SDUPTHER

## 2023-08-01 RX ORDER — AZITHROMYCIN 250 MG/1
250 TABLET, FILM COATED ORAL SEE ADMIN INSTRUCTIONS
Qty: 6 TABLET | Refills: 0 | Status: SHIPPED | OUTPATIENT
Start: 2023-08-01 | End: 2023-08-06

## 2023-08-01 RX ORDER — CHLORTHALIDONE 25 MG/1
TABLET ORAL
Qty: 45 TABLET | Refills: 0 | Status: SHIPPED | OUTPATIENT
Start: 2023-08-01

## 2023-08-01 SDOH — ECONOMIC STABILITY: HOUSING INSECURITY
IN THE LAST 12 MONTHS, WAS THERE A TIME WHEN YOU DID NOT HAVE A STEADY PLACE TO SLEEP OR SLEPT IN A SHELTER (INCLUDING NOW)?: NO

## 2023-08-01 SDOH — ECONOMIC STABILITY: INCOME INSECURITY: HOW HARD IS IT FOR YOU TO PAY FOR THE VERY BASICS LIKE FOOD, HOUSING, MEDICAL CARE, AND HEATING?: NOT HARD AT ALL

## 2023-08-01 SDOH — ECONOMIC STABILITY: FOOD INSECURITY: WITHIN THE PAST 12 MONTHS, YOU WORRIED THAT YOUR FOOD WOULD RUN OUT BEFORE YOU GOT MONEY TO BUY MORE.: NEVER TRUE

## 2023-08-01 SDOH — ECONOMIC STABILITY: FOOD INSECURITY: WITHIN THE PAST 12 MONTHS, THE FOOD YOU BOUGHT JUST DIDN'T LAST AND YOU DIDN'T HAVE MONEY TO GET MORE.: NEVER TRUE

## 2023-08-01 ASSESSMENT — PATIENT HEALTH QUESTIONNAIRE - PHQ9
2. FEELING DOWN, DEPRESSED OR HOPELESS: 0
SUM OF ALL RESPONSES TO PHQ QUESTIONS 1-9: 0
SUM OF ALL RESPONSES TO PHQ9 QUESTIONS 1 & 2: 0
1. LITTLE INTEREST OR PLEASURE IN DOING THINGS: 0

## 2023-08-01 ASSESSMENT — ENCOUNTER SYMPTOMS
VOICE CHANGE: 1
EYES NEGATIVE: 1
COUGH: 1
ALLERGIC/IMMUNOLOGIC NEGATIVE: 1
SHORTNESS OF BREATH: 1

## 2023-08-01 NOTE — PATIENT INSTRUCTIONS
Take xyzal every night  Ricola lemon honey cough cough drops  Gargle with warm salt and water  Drink plenty of water  Take all antibiotic until gone

## 2023-08-09 ENCOUNTER — HOSPITAL ENCOUNTER (OUTPATIENT)
Dept: NON INVASIVE DIAGNOSTICS | Age: 63
Discharge: HOME | End: 2023-08-09
Payer: COMMERCIAL

## 2023-08-09 DIAGNOSIS — R60.0 PEDAL EDEMA: ICD-10-CM

## 2023-08-09 LAB
LV EF: 55 %
LVEF MODALITY: NORMAL

## 2023-08-09 PROCEDURE — 93306 TTE W/DOPPLER COMPLETE: CPT

## 2023-08-28 DIAGNOSIS — R03.0 ELEVATED BP WITHOUT DIAGNOSIS OF HYPERTENSION: ICD-10-CM

## 2023-08-28 RX ORDER — CHLORTHALIDONE 25 MG/1
TABLET ORAL
Qty: 45 TABLET | Refills: 0 | Status: SHIPPED | OUTPATIENT
Start: 2023-08-28

## 2023-09-06 SDOH — ECONOMIC STABILITY: FOOD INSECURITY: WITHIN THE PAST 12 MONTHS, YOU WORRIED THAT YOUR FOOD WOULD RUN OUT BEFORE YOU GOT MONEY TO BUY MORE.: NEVER TRUE

## 2023-09-06 SDOH — ECONOMIC STABILITY: FOOD INSECURITY: WITHIN THE PAST 12 MONTHS, THE FOOD YOU BOUGHT JUST DIDN'T LAST AND YOU DIDN'T HAVE MONEY TO GET MORE.: NEVER TRUE

## 2023-09-06 SDOH — ECONOMIC STABILITY: TRANSPORTATION INSECURITY
IN THE PAST 12 MONTHS, HAS LACK OF TRANSPORTATION KEPT YOU FROM MEETINGS, WORK, OR FROM GETTING THINGS NEEDED FOR DAILY LIVING?: NO

## 2023-09-06 SDOH — ECONOMIC STABILITY: INCOME INSECURITY: HOW HARD IS IT FOR YOU TO PAY FOR THE VERY BASICS LIKE FOOD, HOUSING, MEDICAL CARE, AND HEATING?: NOT HARD AT ALL

## 2023-09-07 ENCOUNTER — OFFICE VISIT (OUTPATIENT)
Dept: INTERNAL MEDICINE CLINIC | Age: 63
End: 2023-09-07
Payer: COMMERCIAL

## 2023-09-07 VITALS
HEIGHT: 65 IN | OXYGEN SATURATION: 98 % | TEMPERATURE: 97.9 F | WEIGHT: 258.8 LBS | DIASTOLIC BLOOD PRESSURE: 90 MMHG | HEART RATE: 64 BPM | SYSTOLIC BLOOD PRESSURE: 148 MMHG | BODY MASS INDEX: 43.12 KG/M2 | RESPIRATION RATE: 18 BRPM

## 2023-09-07 DIAGNOSIS — E78.2 MODERATE MIXED HYPERLIPIDEMIA NOT REQUIRING STATIN THERAPY: ICD-10-CM

## 2023-09-07 DIAGNOSIS — R03.0 ELEVATED BP WITHOUT DIAGNOSIS OF HYPERTENSION: ICD-10-CM

## 2023-09-07 DIAGNOSIS — R73.03 PREDIABETES: Primary | ICD-10-CM

## 2023-09-07 DIAGNOSIS — E78.01 FAMILIAL HYPERCHOLESTEROLEMIA: ICD-10-CM

## 2023-09-07 PROCEDURE — 99214 OFFICE O/P EST MOD 30 MIN: CPT | Performed by: INTERNAL MEDICINE

## 2023-09-07 RX ORDER — CHLORTHALIDONE 25 MG/1
25 TABLET ORAL DAILY
Qty: 90 TABLET | Refills: 3 | Status: SHIPPED | OUTPATIENT
Start: 2023-09-07

## 2023-09-07 RX ORDER — ROSUVASTATIN CALCIUM 10 MG/1
10 TABLET, COATED ORAL DAILY
Qty: 90 TABLET | Refills: 1 | Status: SHIPPED | OUTPATIENT
Start: 2023-09-07

## 2023-09-07 RX ORDER — SEMAGLUTIDE 0.68 MG/ML
INJECTION, SOLUTION SUBCUTANEOUS
Qty: 2 ML | Refills: 5 | Status: SHIPPED | OUTPATIENT
Start: 2023-09-07 | End: 2023-11-02

## 2023-09-07 ASSESSMENT — PATIENT HEALTH QUESTIONNAIRE - PHQ9
SUM OF ALL RESPONSES TO PHQ QUESTIONS 1-9: 2
5. POOR APPETITE OR OVEREATING: 1
6. FEELING BAD ABOUT YOURSELF - OR THAT YOU ARE A FAILURE OR HAVE LET YOURSELF OR YOUR FAMILY DOWN: 0
7. TROUBLE CONCENTRATING ON THINGS, SUCH AS READING THE NEWSPAPER OR WATCHING TELEVISION: 0
SUM OF ALL RESPONSES TO PHQ QUESTIONS 1-9: 2
9. THOUGHTS THAT YOU WOULD BE BETTER OFF DEAD, OR OF HURTING YOURSELF: 0
2. FEELING DOWN, DEPRESSED OR HOPELESS: 0
8. MOVING OR SPEAKING SO SLOWLY THAT OTHER PEOPLE COULD HAVE NOTICED. OR THE OPPOSITE, BEING SO FIGETY OR RESTLESS THAT YOU HAVE BEEN MOVING AROUND A LOT MORE THAN USUAL: 0
SUM OF ALL RESPONSES TO PHQ9 QUESTIONS 1 & 2: 0
10. IF YOU CHECKED OFF ANY PROBLEMS, HOW DIFFICULT HAVE THESE PROBLEMS MADE IT FOR YOU TO DO YOUR WORK, TAKE CARE OF THINGS AT HOME, OR GET ALONG WITH OTHER PEOPLE: 0
4. FEELING TIRED OR HAVING LITTLE ENERGY: 0
1. LITTLE INTEREST OR PLEASURE IN DOING THINGS: 0
SUM OF ALL RESPONSES TO PHQ QUESTIONS 1-9: 2
3. TROUBLE FALLING OR STAYING ASLEEP: 1
SUM OF ALL RESPONSES TO PHQ QUESTIONS 1-9: 2

## 2023-09-07 ASSESSMENT — ANXIETY QUESTIONNAIRES
3. WORRYING TOO MUCH ABOUT DIFFERENT THINGS: 0
7. FEELING AFRAID AS IF SOMETHING AWFUL MIGHT HAPPEN: 0
1. FEELING NERVOUS, ANXIOUS, OR ON EDGE: 0
4. TROUBLE RELAXING: 0
6. BECOMING EASILY ANNOYED OR IRRITABLE: 0
2. NOT BEING ABLE TO STOP OR CONTROL WORRYING: 0
IF YOU CHECKED OFF ANY PROBLEMS ON THIS QUESTIONNAIRE, HOW DIFFICULT HAVE THESE PROBLEMS MADE IT FOR YOU TO DO YOUR WORK, TAKE CARE OF THINGS AT HOME, OR GET ALONG WITH OTHER PEOPLE: NOT DIFFICULT AT ALL
5. BEING SO RESTLESS THAT IT IS HARD TO SIT STILL: 0
GAD7 TOTAL SCORE: 0

## 2023-09-07 NOTE — PROGRESS NOTES
Abby Jones (:  1960) is a 61 y.o. female,Established patient, here for evaluation of the following chief complaint(s):  Follow-up and Discuss Labs         ASSESSMENT/PLAN:  1. Prediabetes  -     Semaglutide,0.25 or 0.5MG/DOS, (OZEMPIC, 0.25 OR 0.5 MG/DOSE,) 2 MG/3ML SOPN; Inject 0.25 mg into the skin once a week for 28 days, THEN 0.5 mg once a week for 28 days. Patient to bring a coupon to the pharmacy. ., Disp-2 mL, R-5Normal  -     Hemoglobin A1C; Future  2. Elevated BP without diagnosis of hypertension  -     chlorthalidone (HYGROTON) 25 MG tablet; Take 1 tablet by mouth daily, Disp-90 tablet, R-3Normal  -     Comprehensive Metabolic Panel; Future  3. Moderate mixed hyperlipidemia not requiring statin therapy  -     Lipid Panel; Future  4. Familial hypercholesterolemia  -     rosuvastatin (CRESTOR) 10 MG tablet; Take 1 tablet by mouth daily, Disp-90 tablet, R-1Normal      No follow-ups on file. Subjective   SUBJECTIVE/OBJECTIVE:  HPI patient comes in for follow-up of high cholesterol and prediabetes. She is interested in starting Ozempic to help with her prediabetes and weight loss. She is little reluctant to start a cholesterol medicine but was willing to try given her high levels of cholesterol.   She is willing to change her diet to consume more fiber but would like to hold off on taking the type of fiber supplement    Lab Results   Component Value Date    CHOL 270 (H) 2023    CHOL 236 (H) 07/10/2019    CHOL 223 (H) 2014     Lab Results   Component Value Date    TRIG 103 2023    TRIG 84 07/10/2019    TRIG 83 2014     Lab Results   Component Value Date    HDL 53 2023    HDL 57 07/10/2019    HDL 66 (H) 2014     Lab Results   Component Value Date    LDLCALC 196 (H) 2023    LDLCALC 162 (H) 07/10/2019    LDLCALC 140 (H) 2014     Lab Results   Component Value Date    LABVLDL 21 2023    LABVLDL 17 07/10/2019    LABVLDL 17 2014

## 2023-09-08 ENCOUNTER — PATIENT MESSAGE (OUTPATIENT)
Dept: INTERNAL MEDICINE CLINIC | Age: 63
End: 2023-09-08

## 2023-09-11 ENCOUNTER — TELEPHONE (OUTPATIENT)
Dept: ADMINISTRATIVE | Age: 63
End: 2023-09-11

## 2023-09-11 NOTE — TELEPHONE ENCOUNTER
From: [facility] [employer]  To: Dr. Patsy Shannon  Sent: 9/8/2023 3:46 PM EDT  Subject: Shingles Shot-     Hello,  I took my first shingles shot when I came in August.  When do I take the 2nd one?    thank you,  DANY

## 2023-09-11 NOTE — TELEPHONE ENCOUNTER
Submitted PA for Ozempic (0.25 or 0.5 MG/DOSE) 2MG/3ML pen-injectors  Via CM Key: [id number] STATUS: Call-in Form  Please call the payer at [phone] to complete this PA. Called number. Spoke with [employer]. Reports the office has to send PA electronically by submitting on their portal [email or web address]. hit provider tab and click Pa submission. Please note patient should be getting medication through mail order.       Prior Auth ([facility]) ID:   [id number]      Drug/Service Name:   Brijesh Drape 0.25-0.5 MG/DOSE PEN  Patient:            Adrianne Salinas Date Requested:         2023 11:51:28 AM    MemberID:      [id number]      :   1960

## 2023-09-13 NOTE — TELEPHONE ENCOUNTER
The medication was DENIED; DENIAL letter uploaded to MEDIA. If you want an APPEAL; please note in this encounter what new information you would like to APPEAL with. Once complete route back to PA POOL. If this requires a response please respond to the pool ( P [facility] [address]). Thank you please advise patient.

## 2023-10-03 ENCOUNTER — OFFICE VISIT (OUTPATIENT)
Dept: INTERNAL MEDICINE CLINIC | Age: 63
End: 2023-10-03
Payer: COMMERCIAL

## 2023-10-03 VITALS
OXYGEN SATURATION: 97 % | DIASTOLIC BLOOD PRESSURE: 84 MMHG | SYSTOLIC BLOOD PRESSURE: 138 MMHG | BODY MASS INDEX: 43.26 KG/M2 | WEIGHT: 256 LBS | HEART RATE: 59 BPM | TEMPERATURE: 97.3 F

## 2023-10-03 DIAGNOSIS — Z23 NEED FOR INFLUENZA VACCINATION: ICD-10-CM

## 2023-10-03 DIAGNOSIS — M26.609 TMJ DYSFUNCTION: Primary | ICD-10-CM

## 2023-10-03 PROCEDURE — 90471 IMMUNIZATION ADMIN: CPT | Performed by: STUDENT IN AN ORGANIZED HEALTH CARE EDUCATION/TRAINING PROGRAM

## 2023-10-03 PROCEDURE — 90674 CCIIV4 VAC NO PRSV 0.5 ML IM: CPT | Performed by: STUDENT IN AN ORGANIZED HEALTH CARE EDUCATION/TRAINING PROGRAM

## 2023-10-03 PROCEDURE — 99213 OFFICE O/P EST LOW 20 MIN: CPT | Performed by: STUDENT IN AN ORGANIZED HEALTH CARE EDUCATION/TRAINING PROGRAM

## 2023-10-03 ASSESSMENT — PATIENT HEALTH QUESTIONNAIRE - PHQ9
SUM OF ALL RESPONSES TO PHQ9 QUESTIONS 1 & 2: 0
SUM OF ALL RESPONSES TO PHQ QUESTIONS 1-9: 0
SUM OF ALL RESPONSES TO PHQ QUESTIONS 1-9: 0
1. LITTLE INTEREST OR PLEASURE IN DOING THINGS: 0
SUM OF ALL RESPONSES TO PHQ QUESTIONS 1-9: 0
SUM OF ALL RESPONSES TO PHQ QUESTIONS 1-9: 0
2. FEELING DOWN, DEPRESSED OR HOPELESS: 0

## 2023-10-03 NOTE — PATIENT INSTRUCTIONS
Thank you for allowing me to care for you!     Patient instructions:  Tylenol and ibuprofen as needed  Ice pack   Schedule with dentist

## 2024-01-15 DIAGNOSIS — R03.0 ELEVATED BP WITHOUT DIAGNOSIS OF HYPERTENSION: ICD-10-CM

## 2024-01-15 DIAGNOSIS — E78.2 MODERATE MIXED HYPERLIPIDEMIA NOT REQUIRING STATIN THERAPY: ICD-10-CM

## 2024-01-15 DIAGNOSIS — R73.03 PREDIABETES: ICD-10-CM

## 2024-01-15 LAB
ALBUMIN SERPL-MCNC: 4.3 G/DL (ref 3.4–5)
ALBUMIN/GLOB SERPL: 1.3 {RATIO} (ref 1.1–2.2)
ALP SERPL-CCNC: 74 U/L (ref 40–129)
ALT SERPL-CCNC: 12 U/L (ref 10–40)
ANION GAP SERPL CALCULATED.3IONS-SCNC: 9 MMOL/L (ref 3–16)
AST SERPL-CCNC: 20 U/L (ref 15–37)
BILIRUB SERPL-MCNC: 0.5 MG/DL (ref 0–1)
BUN SERPL-MCNC: 16 MG/DL (ref 7–20)
CALCIUM SERPL-MCNC: 9.6 MG/DL (ref 8.3–10.6)
CHLORIDE SERPL-SCNC: 99 MMOL/L (ref 99–110)
CHOLEST SERPL-MCNC: 188 MG/DL (ref 0–199)
CO2 SERPL-SCNC: 32 MMOL/L (ref 21–32)
CREAT SERPL-MCNC: 0.8 MG/DL (ref 0.6–1.2)
GFR SERPLBLD CREATININE-BSD FMLA CKD-EPI: >60 ML/MIN/{1.73_M2}
GLUCOSE SERPL-MCNC: 101 MG/DL (ref 70–99)
HDLC SERPL-MCNC: 59 MG/DL (ref 40–60)
LDLC SERPL CALC-MCNC: 104 MG/DL
POTASSIUM SERPL-SCNC: 3.8 MMOL/L (ref 3.5–5.1)
PROT SERPL-MCNC: 7.5 G/DL (ref 6.4–8.2)
SODIUM SERPL-SCNC: 140 MMOL/L (ref 136–145)
TRIGL SERPL-MCNC: 126 MG/DL (ref 0–150)
VLDLC SERPL CALC-MCNC: 25 MG/DL

## 2024-01-16 LAB
EST. AVERAGE GLUCOSE BLD GHB EST-MCNC: 119.8 MG/DL
HBA1C MFR BLD: 5.8 %

## 2024-01-18 ENCOUNTER — OFFICE VISIT (OUTPATIENT)
Dept: INTERNAL MEDICINE CLINIC | Age: 64
End: 2024-01-18
Payer: COMMERCIAL

## 2024-01-18 VITALS
BODY MASS INDEX: 43.82 KG/M2 | DIASTOLIC BLOOD PRESSURE: 72 MMHG | OXYGEN SATURATION: 97 % | TEMPERATURE: 97.3 F | WEIGHT: 263 LBS | HEART RATE: 73 BPM | SYSTOLIC BLOOD PRESSURE: 122 MMHG | HEIGHT: 65 IN | RESPIRATION RATE: 18 BRPM

## 2024-01-18 DIAGNOSIS — I10 PRIMARY HYPERTENSION: ICD-10-CM

## 2024-01-18 DIAGNOSIS — Z23 NEED FOR SHINGLES VACCINE: ICD-10-CM

## 2024-01-18 DIAGNOSIS — E78.2 MODERATE MIXED HYPERLIPIDEMIA NOT REQUIRING STATIN THERAPY: Primary | ICD-10-CM

## 2024-01-18 PROCEDURE — 90471 IMMUNIZATION ADMIN: CPT | Performed by: INTERNAL MEDICINE

## 2024-01-18 PROCEDURE — 3074F SYST BP LT 130 MM HG: CPT | Performed by: INTERNAL MEDICINE

## 2024-01-18 PROCEDURE — 3078F DIAST BP <80 MM HG: CPT | Performed by: INTERNAL MEDICINE

## 2024-01-18 PROCEDURE — 90750 HZV VACC RECOMBINANT IM: CPT | Performed by: INTERNAL MEDICINE

## 2024-01-18 PROCEDURE — 99214 OFFICE O/P EST MOD 30 MIN: CPT | Performed by: INTERNAL MEDICINE

## 2024-01-18 RX ORDER — ERGOCALCIFEROL 1.25 MG/1
50000 CAPSULE ORAL WEEKLY
Qty: 12 CAPSULE | Refills: 1 | Status: SHIPPED | OUTPATIENT
Start: 2024-01-18

## 2024-01-18 RX ORDER — TOPIRAMATE 25 MG/1
25 TABLET ORAL NIGHTLY
Qty: 30 TABLET | Refills: 3 | Status: SHIPPED | OUTPATIENT
Start: 2024-01-18

## 2024-01-18 ASSESSMENT — PATIENT HEALTH QUESTIONNAIRE - PHQ9
SUM OF ALL RESPONSES TO PHQ QUESTIONS 1-9: 0
2. FEELING DOWN, DEPRESSED OR HOPELESS: 0
SUM OF ALL RESPONSES TO PHQ QUESTIONS 1-9: 0
1. LITTLE INTEREST OR PLEASURE IN DOING THINGS: 0
SUM OF ALL RESPONSES TO PHQ9 QUESTIONS 1 & 2: 0
SUM OF ALL RESPONSES TO PHQ QUESTIONS 1-9: 0
SUM OF ALL RESPONSES TO PHQ QUESTIONS 1-9: 0

## 2024-01-18 ASSESSMENT — ANXIETY QUESTIONNAIRES
5. BEING SO RESTLESS THAT IT IS HARD TO SIT STILL: 0
2. NOT BEING ABLE TO STOP OR CONTROL WORRYING: 0
4. TROUBLE RELAXING: 0
7. FEELING AFRAID AS IF SOMETHING AWFUL MIGHT HAPPEN: 0
3. WORRYING TOO MUCH ABOUT DIFFERENT THINGS: 0
1. FEELING NERVOUS, ANXIOUS, OR ON EDGE: 0
IF YOU CHECKED OFF ANY PROBLEMS ON THIS QUESTIONNAIRE, HOW DIFFICULT HAVE THESE PROBLEMS MADE IT FOR YOU TO DO YOUR WORK, TAKE CARE OF THINGS AT HOME, OR GET ALONG WITH OTHER PEOPLE: NOT DIFFICULT AT ALL
GAD7 TOTAL SCORE: 0
6. BECOMING EASILY ANNOYED OR IRRITABLE: 0

## 2024-01-18 NOTE — PROGRESS NOTES
Katerine Robertson (:  1960) is a 63 y.o. female,Established patient, here for evaluation of the following chief complaint(s):  Follow-up         ASSESSMENT/PLAN:  1. Moderate mixed hyperlipidemia not requiring statin therapy  -     vitamin D (ERGOCALCIFEROL) 1.25 MG (33936 UT) CAPS capsule; Take 1 capsule by mouth once a week, Disp-12 capsule, R-1Normal  2. Primary hypertension  -     vitamin D (ERGOCALCIFEROL) 1.25 MG (55699 UT) CAPS capsule; Take 1 capsule by mouth once a week, Disp-12 capsule, R-1Normal      No follow-ups on file.         Subjective   SUBJECTIVE/OBJECTIVE:  HPI  Hyperlipidemia:  patient has new myalgias or GI upset on rosuvastatin (Crestor). Medication compliance: compliant most of the time. Patient is  following a low fat, low cholesterol diet.  She is  exercising regularly.     Lab Results   Component Value Date    CHOL 188 01/15/2024    TRIG 126 01/15/2024    HDL 59 01/15/2024    LDLCALC 104 (H) 01/15/2024     Lab Results   Component Value Date    ALT 12 01/15/2024    AST 20 01/15/2024           Review of Systems       Objective   Vitals:    24 1311   BP: 122/72   Pulse: 73   Resp: 18   Temp: 97.3 °F (36.3 °C)   SpO2: 97%   Weight: 119.3 kg (263 lb)   Height: 1.638 m (5' 4.5\")      Wt Readings from Last 3 Encounters:   24 119.3 kg (263 lb)   10/03/23 116.1 kg (256 lb)   23 117.4 kg (258 lb 12.8 oz)     BP Readings from Last 3 Encounters:   24 122/72   10/03/23 138/84   23 (!) 148/90     Body mass index is 44.45 kg/m². Facility age limit for growth %mino is 20 years.   Physical Exam  Constitutional:       Appearance: Normal appearance.   HENT:      Head: Normocephalic and atraumatic.      Right Ear: Tympanic membrane normal.      Left Ear: Tympanic membrane normal.      Mouth/Throat:      Mouth: Mucous membranes are dry.      Pharynx: No oropharyngeal exudate or posterior oropharyngeal erythema.   Eyes:      General:         Right eye: No discharge.

## 2024-02-12 ENCOUNTER — TELEMEDICINE (OUTPATIENT)
Dept: INTERNAL MEDICINE CLINIC | Age: 64
End: 2024-02-12
Payer: COMMERCIAL

## 2024-02-12 DIAGNOSIS — R73.03 PRE-DIABETES: Primary | ICD-10-CM

## 2024-02-12 PROCEDURE — 99213 OFFICE O/P EST LOW 20 MIN: CPT | Performed by: INTERNAL MEDICINE

## 2024-02-12 RX ORDER — ORAL SEMAGLUTIDE 3 MG/1
3 TABLET ORAL DAILY
Qty: 30 TABLET | Refills: 0 | Status: SHIPPED | COMMUNITY
Start: 2024-02-12

## 2024-02-12 NOTE — PROGRESS NOTES
Katerine Robertson, was evaluated through a synchronous (real-time) audio-video encounter. The patient (or guardian if applicable) is aware that this is a billable service, which includes applicable co-pays. This Virtual Visit was conducted with patient's (and/or legal guardian's) consent. Patient identification was verified, and a caregiver was present when appropriate.   The patient was located at Home: [address] [place] [address] [place] [address]  Provider was located at Facility (Appt Dept): [address] [place] [id number]      Katerine Robertson (:  1960) is a Established patient, presenting virtually for evaluation of the following:    Assessment & Plan   Below is the assessment and plan developed based on review of pertinent history, physical exam, labs, studies, and medications.  1. Pre-diabetes   -     Semaglutide (RYBELSUS) 3 MG TABS; Take 3 mg by mouth daily, Disp-30 tablet, R-0Sample  -   taper down the topamax    No follow-ups on file.       Subjective   HPI patient would like to try a different medication for weight loss. She was taking topiramate 50 mg daily. She would like to try something else Because   Review of Systems       Objective   Patient-Reported Vitals  Patient-Reported Systolic (Top): 134 mmHg  Patient-Reported Diastolic (Bottom): 80 mmHg       Physical Exam  Constitutional:       Appearance: Normal appearance.   HENT:      Right Ear: Tympanic membrane normal.      Left Ear: Tympanic membrane normal.      Nose: Nose normal. No congestion or rhinorrhea.   Eyes:      Extraocular Movements: Extraocular movements intact.      Pupils: Pupils are equal, round, and reactive to light.   Cardiovascular:      Rate and Rhythm: Normal rate and regular rhythm.      Heart sounds: No murmur heard.  Pulmonary:      Effort: Pulmonary effort is normal. No respiratory distress.      Breath sounds: No stridor. No wheezing or rhonchi.   Neurological:      Mental Status: She is alert.     Lab

## 2024-02-13 DIAGNOSIS — E78.01 FAMILIAL HYPERCHOLESTEROLEMIA: ICD-10-CM

## 2024-02-14 RX ORDER — ROSUVASTATIN CALCIUM 10 MG/1
10 TABLET, COATED ORAL DAILY
Qty: 90 TABLET | Refills: 1 | Status: SHIPPED | OUTPATIENT
Start: 2024-02-14

## 2024-04-18 ENCOUNTER — OFFICE VISIT (OUTPATIENT)
Dept: INTERNAL MEDICINE CLINIC | Age: 64
End: 2024-04-18
Payer: COMMERCIAL

## 2024-04-18 VITALS
SYSTOLIC BLOOD PRESSURE: 112 MMHG | BODY MASS INDEX: 43.82 KG/M2 | OXYGEN SATURATION: 99 % | HEART RATE: 80 BPM | WEIGHT: 263 LBS | HEIGHT: 65 IN | RESPIRATION RATE: 18 BRPM | TEMPERATURE: 97.1 F | DIASTOLIC BLOOD PRESSURE: 60 MMHG

## 2024-04-18 DIAGNOSIS — E88.810 METABOLIC SYNDROME: ICD-10-CM

## 2024-04-18 DIAGNOSIS — E88.810 INSULIN RESISTANCE SYNDROME: Primary | ICD-10-CM

## 2024-04-18 PROCEDURE — 99214 OFFICE O/P EST MOD 30 MIN: CPT | Performed by: INTERNAL MEDICINE

## 2024-04-18 RX ORDER — ORAL SEMAGLUTIDE 14 MG/1
14 TABLET ORAL DAILY
Qty: 30 TABLET | Refills: 5 | Status: SHIPPED | OUTPATIENT
Start: 2024-04-18

## 2024-04-18 NOTE — PROGRESS NOTES
Katerine Robertson (:  1960) is a 63 y.o. female,Established patient, here for evaluation of the following chief complaint(s):  Follow-up      Assessment & Plan   1. Insulin resistance syndrome  Not at goal  -     Semaglutide (RYBELSUS) 14 MG TABS; Take 14 mg by mouth daily Patient with insulin resistance with hyperglycemia, Disp-30 tablet, R-5Normal (higher dose)    2. Metabolic syndrome  worsening  -     Semaglutide (RYBELSUS) 14 MG TABS; Take 14 mg by mouth daily Patient with insulin resistance with hyperglycemia, Disp-30 tablet, R-5Normal      Return in about 3 months (around 2024) for f/u weight.       Subjective   HPI  patient comes in for obesity and insulin resistance. She has been taking rybelsus 7 mg which has not cause much weight loss. She would like to increase the dose. She is not been exercising more.      Review of Systems       Objective   Vitals:    24 1346   BP: 112/60   Pulse: 80   Resp: 18   Temp: 97.1 °F (36.2 °C)   SpO2: 99%   Weight: 119.3 kg (263 lb)   Height: 1.638 m (5' 4.5\")      Wt Readings from Last 3 Encounters:   24 119.3 kg (263 lb)   24 119.3 kg (263 lb)   10/03/23 116.1 kg (256 lb)     BP Readings from Last 3 Encounters:   24 112/60   24 122/72   10/03/23 138/84     Body mass index is 44.45 kg/m². Facility age limit for growth %mino is 20 years.   Physical Exam  Constitutional:       General: She is not in acute distress.     Appearance: Normal appearance. She is not ill-appearing.   HENT:      Right Ear: Tympanic membrane normal. There is no impacted cerumen.      Left Ear: There is no impacted cerumen.   Cardiovascular:      Rate and Rhythm: Normal rate and regular rhythm.      Heart sounds: No murmur heard.  Pulmonary:      Effort: Pulmonary effort is normal. No respiratory distress.      Breath sounds: Normal breath sounds. No stridor.   Neurological:      Mental Status: She is alert.            An electronic signature was used

## 2024-04-19 ENCOUNTER — TELEPHONE (OUTPATIENT)
Dept: ADMINISTRATIVE | Age: 64
End: 2024-04-19

## 2024-04-19 NOTE — TELEPHONE ENCOUNTER
Submitted PA for RYBELSUS  14MG  Via CMM Key: [id number] STATUS: NOT SENT.    I need last office note completed. Please advise.    If this requires a response please respond to the pool. (P [facility] MEDICINE Pre-Auth).    Please advise patient thank you.

## 2024-04-23 NOTE — TELEPHONE ENCOUNTER
Submitted PA for RYBELSUS 14MG TAB  Via CMM Key: [id number] STATUS: APPROVAL    If this requires a response please respond to the pool. (P [facility] MEDICINE Pre-Auth).    Please advise patient thank you.

## 2024-05-28 DIAGNOSIS — E88.810 INSULIN RESISTANCE SYNDROME: ICD-10-CM

## 2024-05-28 DIAGNOSIS — E88.810 METABOLIC SYNDROME: ICD-10-CM

## 2024-05-29 RX ORDER — ORAL SEMAGLUTIDE 14 MG/1
14 TABLET ORAL DAILY
Qty: 30 TABLET | Refills: 5 | Status: SHIPPED | OUTPATIENT
Start: 2024-05-29

## 2024-06-10 DIAGNOSIS — I10 PRIMARY HYPERTENSION: ICD-10-CM

## 2024-06-10 DIAGNOSIS — E78.2 MODERATE MIXED HYPERLIPIDEMIA NOT REQUIRING STATIN THERAPY: ICD-10-CM

## 2024-06-10 RX ORDER — ERGOCALCIFEROL 1.25 MG/1
50000 CAPSULE ORAL WEEKLY
Qty: 12 CAPSULE | Refills: 1 | Status: SHIPPED | OUTPATIENT
Start: 2024-06-10

## 2024-06-27 DIAGNOSIS — E88.810 METABOLIC SYNDROME: ICD-10-CM

## 2024-06-27 DIAGNOSIS — E88.810 INSULIN RESISTANCE SYNDROME: ICD-10-CM

## 2024-06-28 RX ORDER — ORAL SEMAGLUTIDE 14 MG/1
14 TABLET ORAL DAILY
Qty: 30 TABLET | Refills: 5 | Status: SHIPPED | OUTPATIENT
Start: 2024-06-28

## 2024-06-28 NOTE — TELEPHONE ENCOUNTER
Recent Visits  Date Type Provider Dept   04/18/24 Office Visit Shay Hogue MD [facility]   01/18/24 Office Visit Shay Hogue MD [facility]   10/03/23 Office Visit Ban Hou DO [facility]   09/07/23 Office Visit Shay Hogue MD [facility]   08/01/23 Office Visit Stacie Rashid, APRN - CNP [facility]   07/07/23 Office Visit Shay Hogue MD [facility]   Showing recent visits within past 540 days with a meds authorizing provider and meeting all other requirements  Future Appointments  Date Type Provider Dept   07/19/24 Appointment Shay Hogue MD [facility]   Showing future appointments within next 150 days with a meds authorizing provider and meeting all other requirements     4/18/2024

## 2024-07-19 ENCOUNTER — OFFICE VISIT (OUTPATIENT)
Dept: INTERNAL MEDICINE CLINIC | Age: 64
End: 2024-07-19
Payer: COMMERCIAL

## 2024-07-19 VITALS
SYSTOLIC BLOOD PRESSURE: 132 MMHG | TEMPERATURE: 97.1 F | HEIGHT: 65 IN | RESPIRATION RATE: 18 BRPM | BODY MASS INDEX: 40.48 KG/M2 | DIASTOLIC BLOOD PRESSURE: 80 MMHG | HEART RATE: 68 BPM | WEIGHT: 243 LBS | OXYGEN SATURATION: 98 %

## 2024-07-19 DIAGNOSIS — E88.810 METABOLIC SYNDROME: ICD-10-CM

## 2024-07-19 DIAGNOSIS — E66.01 CLASS 3 SEVERE OBESITY DUE TO EXCESS CALORIES WITHOUT SERIOUS COMORBIDITY WITH BODY MASS INDEX (BMI) OF 40.0 TO 44.9 IN ADULT (HCC): Primary | ICD-10-CM

## 2024-07-19 DIAGNOSIS — E88.810 INSULIN RESISTANCE SYNDROME: ICD-10-CM

## 2024-07-19 PROCEDURE — 99213 OFFICE O/P EST LOW 20 MIN: CPT | Performed by: INTERNAL MEDICINE

## 2024-07-19 RX ORDER — ORAL SEMAGLUTIDE 14 MG/1
14 TABLET ORAL DAILY
Qty: 90 TABLET | Refills: 3 | Status: SHIPPED | OUTPATIENT
Start: 2024-07-19

## 2024-07-19 ASSESSMENT — PATIENT HEALTH QUESTIONNAIRE - PHQ9
SUM OF ALL RESPONSES TO PHQ QUESTIONS 1-9: 0
1. LITTLE INTEREST OR PLEASURE IN DOING THINGS: NOT AT ALL
SUM OF ALL RESPONSES TO PHQ9 QUESTIONS 1 & 2: 0
SUM OF ALL RESPONSES TO PHQ QUESTIONS 1-9: 0
2. FEELING DOWN, DEPRESSED OR HOPELESS: NOT AT ALL

## 2024-07-19 ASSESSMENT — ANXIETY QUESTIONNAIRES
5. BEING SO RESTLESS THAT IT IS HARD TO SIT STILL: NOT AT ALL
2. NOT BEING ABLE TO STOP OR CONTROL WORRYING: NOT AT ALL
3. WORRYING TOO MUCH ABOUT DIFFERENT THINGS: NOT AT ALL
IF YOU CHECKED OFF ANY PROBLEMS ON THIS QUESTIONNAIRE, HOW DIFFICULT HAVE THESE PROBLEMS MADE IT FOR YOU TO DO YOUR WORK, TAKE CARE OF THINGS AT HOME, OR GET ALONG WITH OTHER PEOPLE: NOT DIFFICULT AT ALL
GAD7 TOTAL SCORE: 2
6. BECOMING EASILY ANNOYED OR IRRITABLE: NOT AT ALL
7. FEELING AFRAID AS IF SOMETHING AWFUL MIGHT HAPPEN: NOT AT ALL
1. FEELING NERVOUS, ANXIOUS, OR ON EDGE: SEVERAL DAYS
4. TROUBLE RELAXING: SEVERAL DAYS

## 2024-07-19 ASSESSMENT — ENCOUNTER SYMPTOMS
DIARRHEA: 0
ABDOMINAL PAIN: 0
NAUSEA: 0
CONSTIPATION: 0
CHEST TIGHTNESS: 1
VOMITING: 0

## 2024-07-19 NOTE — PROGRESS NOTES
(headaches lasting 5 days, posibly due to work). Negative for dizziness and light-headedness.     Vitals:    07/19/24 1052   BP: 132/80   Pulse: 68   Resp: 18   Temp: 97.1 °F (36.2 °C)   SpO2: 98%   Weight: 110.2 kg (243 lb)   Height: 1.638 m (5' 4.5\")       Allergies:  Patient has no known allergies.    Outpatient Medications Marked as Taking for the 7/19/24 encounter (Office Visit) with Shay Hogue MD   Medication Sig Dispense Refill    Semaglutide (RYBELSUS) 14 MG TABS Take 14 mg by mouth daily Patient with insulin resistance with hyperglycemia 90 tablet 3     Objective:   Physical Exam  Constitutional:       General: She is not in acute distress.     Appearance: Normal appearance.   HENT:      Head: Normocephalic and atraumatic.   Cardiovascular:      Rate and Rhythm: Normal rate and regular rhythm.      Pulses: Normal pulses.      Heart sounds: Normal heart sounds. No murmur heard.     No friction rub. No gallop.   Pulmonary:      Effort: Pulmonary effort is normal.      Breath sounds: Normal breath sounds. No wheezing, rhonchi or rales.   Abdominal:      General: Abdomen is flat.      Palpations: Abdomen is soft.      Tenderness: There is no abdominal tenderness.   Musculoskeletal:      Right lower leg: No edema.      Left lower leg: No edema.   Skin:     General: Skin is warm and dry.   Neurological:      Mental Status: She is alert. Mental status is at baseline.   Psychiatric:         Mood and Affect: Mood normal.         Behavior: Behavior normal.         Thought Content: Thought content normal.         Judgment: Judgment normal.       Assessment / Plan:     1. Class 3 severe obesity due to excess calories without serious comorbidity with body mass index (BMI) of 40.0 to 44.9 in adult (HCC)  -Patient has lost 20 lbs in last 3 months  -Continue Rybelsus 14 mg  -Advised to eat more whole grain crackers or protein for snacking to help with hunger  -Encouraged to eat more fruits and

## 2024-07-25 ENCOUNTER — HOSPITAL ENCOUNTER (OUTPATIENT)
Dept: WOMENS IMAGING | Age: 64
Discharge: HOME | End: 2024-07-25
Payer: COMMERCIAL

## 2024-07-25 VITALS — WEIGHT: 243 LBS | HEIGHT: 65 IN | BODY MASS INDEX: 40.48 KG/M2

## 2024-07-25 DIAGNOSIS — Z12.31 VISIT FOR SCREENING MAMMOGRAM: ICD-10-CM

## 2024-07-25 PROCEDURE — 77063 BREAST TOMOSYNTHESIS BI: CPT

## 2024-07-28 DIAGNOSIS — R92.8 ABNORMAL MAMMOGRAM: Primary | ICD-10-CM

## 2024-07-30 DIAGNOSIS — E88.810 METABOLIC SYNDROME: ICD-10-CM

## 2024-07-30 DIAGNOSIS — E88.810 INSULIN RESISTANCE SYNDROME: ICD-10-CM

## 2024-07-30 RX ORDER — ORAL SEMAGLUTIDE 14 MG/1
14 TABLET ORAL DAILY
Qty: 90 TABLET | Refills: 3 | OUTPATIENT
Start: 2024-07-30

## 2024-08-02 ENCOUNTER — HOSPITAL ENCOUNTER (OUTPATIENT)
Dept: ULTRASOUND IMAGING | Age: 64
Discharge: HOME | End: 2024-08-02
Payer: COMMERCIAL

## 2024-08-02 DIAGNOSIS — R92.8 ABNORMAL MAMMOGRAM: ICD-10-CM

## 2024-08-02 PROCEDURE — 76642 ULTRASOUND BREAST LIMITED: CPT

## 2024-08-14 ENCOUNTER — PATIENT MESSAGE (OUTPATIENT)
Dept: INTERNAL MEDICINE CLINIC | Age: 64
End: 2024-08-14

## 2024-08-19 NOTE — TELEPHONE ENCOUNTER
Left message to call the office at [phone] or send a message on [employer] to schedule appointment.

## 2024-08-20 ENCOUNTER — HOSPITAL ENCOUNTER (OUTPATIENT)
Dept: MAMMOGRAPHY | Age: 64
Discharge: HOME | End: 2024-08-20
Payer: COMMERCIAL

## 2024-08-20 ENCOUNTER — HOSPITAL ENCOUNTER (OUTPATIENT)
Dept: ULTRASOUND IMAGING | Age: 64
Discharge: HOME | End: 2024-08-20
Payer: COMMERCIAL

## 2024-08-20 ENCOUNTER — OFFICE VISIT (OUTPATIENT)
Dept: INTERNAL MEDICINE CLINIC | Age: 64
End: 2024-08-20
Payer: COMMERCIAL

## 2024-08-20 VITALS
TEMPERATURE: 97.8 F | OXYGEN SATURATION: 97 % | RESPIRATION RATE: 18 BRPM | BODY MASS INDEX: 39.72 KG/M2 | HEIGHT: 65 IN | HEART RATE: 60 BPM | DIASTOLIC BLOOD PRESSURE: 70 MMHG | SYSTOLIC BLOOD PRESSURE: 122 MMHG | WEIGHT: 238.4 LBS

## 2024-08-20 DIAGNOSIS — R07.9 CHEST PAIN, UNSPECIFIED TYPE: Primary | ICD-10-CM

## 2024-08-20 DIAGNOSIS — R92.8 ABNORMAL MAMMOGRAM: ICD-10-CM

## 2024-08-20 DIAGNOSIS — R59.9 ENLARGED LYMPH NODE: ICD-10-CM

## 2024-08-20 PROCEDURE — 76942 ECHO GUIDE FOR BIOPSY: CPT

## 2024-08-20 PROCEDURE — 93000 ELECTROCARDIOGRAM COMPLETE: CPT | Performed by: INTERNAL MEDICINE

## 2024-08-20 PROCEDURE — 88184 FLOWCYTOMETRY/ TC 1 MARKER: CPT

## 2024-08-20 PROCEDURE — 77065 DX MAMMO INCL CAD UNI: CPT

## 2024-08-20 PROCEDURE — 88185 FLOWCYTOMETRY/TC ADD-ON: CPT

## 2024-08-20 PROCEDURE — 88342 IMHCHEM/IMCYTCHM 1ST ANTB: CPT

## 2024-08-20 PROCEDURE — 88305 TISSUE EXAM BY PATHOLOGIST: CPT

## 2024-08-20 PROCEDURE — 99214 OFFICE O/P EST MOD 30 MIN: CPT | Performed by: INTERNAL MEDICINE

## 2024-08-20 PROCEDURE — 88341 IMHCHEM/IMCYTCHM EA ADD ANTB: CPT

## 2024-08-20 ASSESSMENT — ANXIETY QUESTIONNAIRES
1. FEELING NERVOUS, ANXIOUS, OR ON EDGE: NOT AT ALL
5. BEING SO RESTLESS THAT IT IS HARD TO SIT STILL: NOT AT ALL
GAD7 TOTAL SCORE: 0
6. BECOMING EASILY ANNOYED OR IRRITABLE: NOT AT ALL
3. WORRYING TOO MUCH ABOUT DIFFERENT THINGS: NOT AT ALL
2. NOT BEING ABLE TO STOP OR CONTROL WORRYING: NOT AT ALL
4. TROUBLE RELAXING: NOT AT ALL
IF YOU CHECKED OFF ANY PROBLEMS ON THIS QUESTIONNAIRE, HOW DIFFICULT HAVE THESE PROBLEMS MADE IT FOR YOU TO DO YOUR WORK, TAKE CARE OF THINGS AT HOME, OR GET ALONG WITH OTHER PEOPLE: NOT DIFFICULT AT ALL
7. FEELING AFRAID AS IF SOMETHING AWFUL MIGHT HAPPEN: NOT AT ALL

## 2024-08-20 ASSESSMENT — PATIENT HEALTH QUESTIONNAIRE - PHQ9
SUM OF ALL RESPONSES TO PHQ QUESTIONS 1-9: 0
SUM OF ALL RESPONSES TO PHQ9 QUESTIONS 1 & 2: 0
1. LITTLE INTEREST OR PLEASURE IN DOING THINGS: NOT AT ALL
SUM OF ALL RESPONSES TO PHQ QUESTIONS 1-9: 0
2. FEELING DOWN, DEPRESSED OR HOPELESS: NOT AT ALL
SUM OF ALL RESPONSES TO PHQ QUESTIONS 1-9: 0
SUM OF ALL RESPONSES TO PHQ QUESTIONS 1-9: 0

## 2024-08-20 NOTE — PROGRESS NOTES
Katerine Robertson (:  1960) is a 63 y.o. female,Established patient, here for evaluation of the following chief complaint(s):  Other (Chest tightness )         Assessment & Plan  Chest pain, unspecified type   Unclear control, send for stress test    Orders:  •  Exercise stress test; Future  •  EKG 12 Lead      No follow-ups on file.       Subjective   Chest Pain   This is a chronic problem. The current episode started more than 1 month ago. The onset quality is gradual. The problem has been waxing and waning. The quality of the pain is described as numbness. The pain does not radiate. Pertinent negatives include no claudication, cough, leg pain, lower extremity edema, malaise/fatigue, palpitations or shortness of breath. She has tried acetaminophen for the symptoms. The treatment provided mild relief. Risk factors include sedentary lifestyle, lack of exercise and obesity.   Pertinent negatives for past medical history include no cancer, no congenital heart disease, no CHF, no pacemaker, no recent injury, no sickle cell disease and no sleep apnea.   Pertinent negatives for family medical history include: no connective tissue disease, no diabetes, no PE and no PVD.       Review of Systems   Constitutional:  Negative for malaise/fatigue.   Respiratory:  Negative for cough and shortness of breath.    Cardiovascular:  Positive for chest pain. Negative for palpitations and claudication.          Objective   Vitals:    24 1225   BP: 122/70   Pulse: 60   Resp: 18   Temp: 97.8 °F (36.6 °C)   SpO2: 97%   Weight: 108.1 kg (238 lb 6.4 oz)   Height: 1.638 m (5' 4.5\")      Wt Readings from Last 3 Encounters:   24 108.1 kg (238 lb 6.4 oz)   24 110.2 kg (243 lb)   24 110.2 kg (243 lb)     BP Readings from Last 3 Encounters:   24 122/70   24 132/80   24 112/60     Body mass index is 40.29 kg/m². Facility age limit for growth %mino is 20 years.   Physical

## 2024-08-21 ASSESSMENT — ENCOUNTER SYMPTOMS
SHORTNESS OF BREATH: 0
COUGH: 0

## 2024-08-27 ENCOUNTER — NUTRITION (OUTPATIENT)
Dept: INTERNAL MEDICINE CLINIC | Age: 64
End: 2024-08-27

## 2024-08-27 NOTE — PROGRESS NOTES
NUTRITION THERAPY ASSESSMENT     Referring Physician: Shay Hogue MD.   PCP: Shay Hogue MD.   Referring diagnosis: Class 3 severe obesity.    Start time: 8:30  End time: 9:15    Katerine Robertson is a 64 y.o. female presents with questions about weight loss.        OBJECTIVE DATA:  Past Medical History:   Diagnosis Date    Arthritis     left knee    Obesity     Primary osteoarthritis of left knee 8/14/2017       Labs:  Lab Results   Component Value Date/Time    TRIG 126 01/15/2024 09:05 AM    HDL 59 01/15/2024 09:05 AM    HDL 44 08/24/2010 08:57 AM     Lab Results   Component Value Date/Time    LABA1C 5.8 01/15/2024 09:05 AM     Medications: ergocalciferol, semaglutide.     Anthropometric Measures:   Height: 5'4\"  Current Weight: 238# (8/20/24)    Usual Body Weight: 238-263# 9.5% weight loss in 4 months.       Ideal Body Weight: 120#   % Ideal Body Weight: 198%       >or equal to 40 Obese Class III    Wt Readings from Last 7 Encounters:   08/20/24 108.1 kg (238 lb 6.4 oz)   07/25/24 110.2 kg (243 lb)   07/19/24 110.2 kg (243 lb)   04/18/24 119.3 kg (263 lb)   01/18/24 119.3 kg (263 lb)   10/03/23 116.1 kg (256 lb)   09/07/23 117.4 kg (258 lb 12.8 oz)     Nutrition-focused Physical Findings:           Bowel Pattern: not discussed.   Skin: No issues noted  Chewing / Swallowing: No issues reported    Food/Nutrition-Related History:  Home Diet: Regular  Home Supplements / Herbals: None reported   Food Restrictions / Cultural Requests:    Food Allergies: NKFA.     Smoker: no.   Consumes Alcohol: Isabelle twice a month.     Physical Activity: walks 3 times a week - 1 mile.     Diet Recall  Breakfast:  Kachava smoothie, Egg cheese and sausage Mcmuffin, cereal, egg and smith sandwich (home).     Lunch:  [employer] (bologna, turkey), chicken quesidilla, fast food: cheese burger, chipolte, chick geena a.      Dinner:  Same as lunch.     Snacks:  Sometimes, grapes, pineapple, pretzels, smoothie, carrots with ranch.

## 2024-08-28 ENCOUNTER — TRANSCRIBE ORDERS (OUTPATIENT)
Dept: ADMINISTRATIVE | Age: 64
End: 2024-08-28

## 2024-09-03 ENCOUNTER — HOSPITAL ENCOUNTER (OUTPATIENT)
Age: 64
Discharge: HOME | End: 2024-09-03
Attending: STUDENT IN AN ORGANIZED HEALTH CARE EDUCATION/TRAINING PROGRAM
Payer: COMMERCIAL

## 2024-09-03 DIAGNOSIS — R59.0 LOCALIZED ENLARGED LYMPH NODES: ICD-10-CM

## 2024-09-03 LAB
EGFR, POC: >90 ML/MIN/1.73M2
POC CREATININE: 0.7 MG/DL (ref 0.6–1.2)

## 2024-09-03 PROCEDURE — 74177 CT ABD & PELVIS W/CONTRAST: CPT

## 2024-09-03 PROCEDURE — 6360000004 HC RX CONTRAST MEDICATION: Performed by: STUDENT IN AN ORGANIZED HEALTH CARE EDUCATION/TRAINING PROGRAM

## 2024-09-03 PROCEDURE — 82565 ASSAY OF CREATININE: CPT

## 2024-09-03 RX ORDER — IOPAMIDOL 755 MG/ML
75 INJECTION, SOLUTION INTRAVASCULAR
Status: COMPLETED | OUTPATIENT
Start: 2024-09-03 | End: 2024-09-03

## 2024-09-03 RX ADMIN — IOPAMIDOL 75 ML: 755 INJECTION, SOLUTION INTRAVENOUS at 11:06

## 2024-09-05 ENCOUNTER — TELEPHONE (OUTPATIENT)
Dept: INTERNAL MEDICINE CLINIC | Age: 64
End: 2024-09-05

## 2024-09-05 DIAGNOSIS — I10 PRIMARY HYPERTENSION: ICD-10-CM

## 2024-09-05 DIAGNOSIS — E78.2 MODERATE MIXED HYPERLIPIDEMIA NOT REQUIRING STATIN THERAPY: ICD-10-CM

## 2024-09-05 DIAGNOSIS — E78.01 FAMILIAL HYPERCHOLESTEROLEMIA: ICD-10-CM

## 2024-09-06 ENCOUNTER — TELEPHONE (OUTPATIENT)
Dept: CARDIOLOGY CLINIC | Age: 64
End: 2024-09-06

## 2024-09-06 ENCOUNTER — HOSPITAL ENCOUNTER (OUTPATIENT)
Age: 64
Discharge: HOME | End: 2024-09-08
Attending: INTERNAL MEDICINE
Payer: COMMERCIAL

## 2024-09-06 VITALS
DIASTOLIC BLOOD PRESSURE: 72 MMHG | SYSTOLIC BLOOD PRESSURE: 129 MMHG | HEIGHT: 64 IN | BODY MASS INDEX: 40.63 KG/M2 | WEIGHT: 238 LBS | HEART RATE: 71 BPM

## 2024-09-06 DIAGNOSIS — R07.9 CHEST PAIN, UNSPECIFIED TYPE: ICD-10-CM

## 2024-09-06 LAB
ECHO BSA: 2.21 M2
STRESS BASELINE DIAS BP: 72 MMHG
STRESS BASELINE HR: 71 BPM
STRESS BASELINE ST DEPRESSION: 0 MM
STRESS BASELINE SYS BP: 129 MMHG
STRESS ESTIMATED WORKLOAD: 8.5 METS
STRESS EXERCISE DUR MIN: 6 MIN
STRESS EXERCISE DUR SEC: 30 SEC
STRESS O2 SAT PEAK: 98 %
STRESS O2 SAT REST: 97 %
STRESS PEAK DIAS BP: 84 MMHG
STRESS PEAK SYS BP: 191 MMHG
STRESS PERCENT HR ACHIEVED: 101 %
STRESS POST PEAK HR: 157 BPM
STRESS RATE PRESSURE PRODUCT: NORMAL BPM*MMHG
STRESS TARGET HR: 156 BPM

## 2024-09-06 PROCEDURE — 93018 CV STRESS TEST I&R ONLY: CPT | Performed by: INTERNAL MEDICINE

## 2024-09-06 PROCEDURE — 93016 CV STRESS TEST SUPVJ ONLY: CPT | Performed by: INTERNAL MEDICINE

## 2024-09-06 PROCEDURE — 93017 CV STRESS TEST TRACING ONLY: CPT

## 2024-09-06 RX ORDER — ERGOCALCIFEROL 1.25 MG/1
50000 CAPSULE, LIQUID FILLED ORAL WEEKLY
Qty: 12 CAPSULE | Refills: 1 | Status: SHIPPED | OUTPATIENT
Start: 2024-09-06

## 2024-09-06 RX ORDER — ROSUVASTATIN CALCIUM 10 MG/1
10 TABLET, COATED ORAL DAILY
Qty: 90 TABLET | Refills: 1 | Status: SHIPPED | OUTPATIENT
Start: 2024-09-06

## 2024-09-06 NOTE — TELEPHONE ENCOUNTER
Informed by Dr Murdock about stress test result.     Spoke to Katerine.    Reported abnormal stress test result was sent to Dr Hogue, who will further address.  Pt verbalizes understanding.

## 2024-09-11 NOTE — PROGRESS NOTES
[facility] PRE-SURGICAL TESTING INSTRUCTIONS                      PRIOR TO PROCEDURE DATE:    1. PLEASE FOLLOW ANY INSTRUCTIONS GIVEN TO YOU PER YOUR SURGEON.      2. Arrange for someone to drive you home and be with you for the first 24 hours after discharge for your safety after your procedure for which you received sedation. Ensure it is someone we can share information with regarding your discharge.     NOTE: At this time ONLY 2 ADULTS may accompany you   One person ENCOURAGED to stay at hospital entire time if outpatient surgery      3. You must contact your surgeon for instructions IF:  You are taking any blood thinners, aspirin, anti-inflammatory or vitamins.  There is a change in your physical condition such as a cold, fever, rash, cuts, sores, or any other infection, especially near your surgical site.    4. Do not drink alcohol the day before or day of your procedure.  Do not use any recreational marijuana at least 24 hours or street drugs (heroin, cocaine) at minimum 5 days prior to your procedure.     5. A Pre-Surgical History and Physical MUST be completed WITHIN 30 DAYS OR LESS prior to your procedure.by your Physician or an Urgent Care        THE DAY OF YOUR PROCEDURE:  1.  Follow instructions for ARRIVAL TIME as DIRECTED BY YOUR SURGEON.     2. Enter the MAIN entrance from [facility] and follow the signs to the free Parking Garage or  Parking (offered free of charge 7 am-5pm).      3. Enter the Main Entrance of the hospital (do not enter from the lower level of the parking garage). Upon entrance, check in with the  at the surgical information desk on your LEFT.   Bring your insurance card and photo ID to register      4. DO NOT EAT ANYTHING 8 hours prior to arrival for surgery.  You may have up to 8 ounces of water 4 hours prior to your arrival for surgery.   NOTE: ALL Gastric, Bariatric & Bowel surgery patients - you MUST follow your surgeon's instructions regarding  drowsiness, changes in your vital signs or breathing patterns. Nausea, headache, muscle aches, or sore throat may also occur after anesthesia.  Your nurse will help you manage these potential side effects.    2. For comfort and safety, arrange to have someone at home with you for the first 24 hours after discharge.    3. You and your family will be given written instructions about your diet, activity, dressing care, medications, and return visits.     4. Once at home, should issues with nausea, pain, or bleeding occur, or should you notice any signs of infection, you should call your surgeon.    5. Always clean your hands before and after caring for your wound. Do not let your family touch your surgery site without cleaning their hands.     6. Narcotic pain medications can cause significant constipation.  You may want to add a stool softener to your postoperative medication schedule or speak to your surgeon on how best to manage this SIDE EFFECT.    SPECIAL INSTRUCTIONS     Thank you for allowing us to care for you.  We strive to exceed your expectations in the delivery of care and service provided to you and your family.     If you need to contact the Pre-Admission Testing staff for any reason, please call us at [phone]    Instructions reviewed with patient during preadmission testing phone interview.  Nadia Santos RN.9/11/2024 .3:15 PM      ADDITIONAL EDUCATIONAL INFORMATION REVIEWED PER PHONE WITH YOU AND/OR YOUR FAMILY:  No Hibiclens® Bathing Instructions   Yes Antibacterial Soap

## 2024-09-13 ENCOUNTER — OFFICE VISIT (OUTPATIENT)
Dept: INTERNAL MEDICINE CLINIC | Age: 64
End: 2024-09-13
Payer: COMMERCIAL

## 2024-09-13 VITALS
SYSTOLIC BLOOD PRESSURE: 122 MMHG | BODY MASS INDEX: 41.37 KG/M2 | DIASTOLIC BLOOD PRESSURE: 70 MMHG | OXYGEN SATURATION: 97 % | HEART RATE: 64 BPM | WEIGHT: 241 LBS

## 2024-09-13 DIAGNOSIS — I10 PRIMARY HYPERTENSION: ICD-10-CM

## 2024-09-13 DIAGNOSIS — Z01.818 PRE-OP EVALUATION: Primary | ICD-10-CM

## 2024-09-13 DIAGNOSIS — E78.2 MODERATE MIXED HYPERLIPIDEMIA NOT REQUIRING STATIN THERAPY: ICD-10-CM

## 2024-09-13 PROCEDURE — 3078F DIAST BP <80 MM HG: CPT | Performed by: STUDENT IN AN ORGANIZED HEALTH CARE EDUCATION/TRAINING PROGRAM

## 2024-09-13 PROCEDURE — 99214 OFFICE O/P EST MOD 30 MIN: CPT | Performed by: STUDENT IN AN ORGANIZED HEALTH CARE EDUCATION/TRAINING PROGRAM

## 2024-09-13 PROCEDURE — 3074F SYST BP LT 130 MM HG: CPT | Performed by: STUDENT IN AN ORGANIZED HEALTH CARE EDUCATION/TRAINING PROGRAM

## 2024-09-13 SDOH — ECONOMIC STABILITY: FOOD INSECURITY: WITHIN THE PAST 12 MONTHS, YOU WORRIED THAT YOUR FOOD WOULD RUN OUT BEFORE YOU GOT MONEY TO BUY MORE.: NEVER TRUE

## 2024-09-13 SDOH — ECONOMIC STABILITY: INCOME INSECURITY: HOW HARD IS IT FOR YOU TO PAY FOR THE VERY BASICS LIKE FOOD, HOUSING, MEDICAL CARE, AND HEATING?: NOT HARD AT ALL

## 2024-09-13 SDOH — ECONOMIC STABILITY: FOOD INSECURITY: WITHIN THE PAST 12 MONTHS, THE FOOD YOU BOUGHT JUST DIDN'T LAST AND YOU DIDN'T HAVE MONEY TO GET MORE.: NEVER TRUE

## 2024-09-13 ASSESSMENT — PATIENT HEALTH QUESTIONNAIRE - PHQ9
6. FEELING BAD ABOUT YOURSELF - OR THAT YOU ARE A FAILURE OR HAVE LET YOURSELF OR YOUR FAMILY DOWN: NOT AT ALL
SUM OF ALL RESPONSES TO PHQ QUESTIONS 1-9: 4
2. FEELING DOWN, DEPRESSED OR HOPELESS: NOT AT ALL
3. TROUBLE FALLING OR STAYING ASLEEP: MORE THAN HALF THE DAYS
7. TROUBLE CONCENTRATING ON THINGS, SUCH AS READING THE NEWSPAPER OR WATCHING TELEVISION: NOT AT ALL
5. POOR APPETITE OR OVEREATING: NOT AT ALL
4. FEELING TIRED OR HAVING LITTLE ENERGY: MORE THAN HALF THE DAYS
1. LITTLE INTEREST OR PLEASURE IN DOING THINGS: NOT AT ALL
10. IF YOU CHECKED OFF ANY PROBLEMS, HOW DIFFICULT HAVE THESE PROBLEMS MADE IT FOR YOU TO DO YOUR WORK, TAKE CARE OF THINGS AT HOME, OR GET ALONG WITH OTHER PEOPLE: NOT DIFFICULT AT ALL
9. THOUGHTS THAT YOU WOULD BE BETTER OFF DEAD, OR OF HURTING YOURSELF: NOT AT ALL
SUM OF ALL RESPONSES TO PHQ QUESTIONS 1-9: 4
8. MOVING OR SPEAKING SO SLOWLY THAT OTHER PEOPLE COULD HAVE NOTICED. OR THE OPPOSITE, BEING SO FIGETY OR RESTLESS THAT YOU HAVE BEEN MOVING AROUND A LOT MORE THAN USUAL: NOT AT ALL
SUM OF ALL RESPONSES TO PHQ9 QUESTIONS 1 & 2: 0

## 2024-09-13 ASSESSMENT — ANXIETY QUESTIONNAIRES
2. NOT BEING ABLE TO STOP OR CONTROL WORRYING: NOT AT ALL
4. TROUBLE RELAXING: SEVERAL DAYS
GAD7 TOTAL SCORE: 1
7. FEELING AFRAID AS IF SOMETHING AWFUL MIGHT HAPPEN: NOT AT ALL
6. BECOMING EASILY ANNOYED OR IRRITABLE: NOT AT ALL
1. FEELING NERVOUS, ANXIOUS, OR ON EDGE: NOT AT ALL
5. BEING SO RESTLESS THAT IT IS HARD TO SIT STILL: NOT AT ALL
IF YOU CHECKED OFF ANY PROBLEMS ON THIS QUESTIONNAIRE, HOW DIFFICULT HAVE THESE PROBLEMS MADE IT FOR YOU TO DO YOUR WORK, TAKE CARE OF THINGS AT HOME, OR GET ALONG WITH OTHER PEOPLE: NOT DIFFICULT AT ALL
3. WORRYING TOO MUCH ABOUT DIFFERENT THINGS: NOT AT ALL

## 2024-09-13 ASSESSMENT — ENCOUNTER SYMPTOMS
WHEEZING: 0
SHORTNESS OF BREATH: 0
ABDOMINAL PAIN: 0
COUGH: 0

## 2024-09-13 NOTE — H&P (VIEW-ONLY)
left. Peroneus brevis tendon repair, left.Peroneus brevis & longus tenodesis, left     KNEE ARTHROSCOPY Left 2010    TUBAL LIGATION      US LYMPH NODE BIOPSY  2024    US LYMPH NODE BIOPSY 2024 [facility]TRASOUND     Family History   Problem Relation Age of Onset    High Blood Pressure Mother     Diabetes Father     Diabetes Sister     Stroke Sister     High Blood Pressure Sister     Diabetes Sister     Lupus Sister     Depression Sister     Bipolar Disorder Sister     Diabetes Paternal Grandmother      Social History     Socioeconomic History    Marital status:      Spouse name: Not on file    Number of children: Not on file    Years of education: Not on file    Highest education level: Not on file   Occupational History    Not on file   Tobacco Use    Smoking status: Former     Current packs/day: 0.00     Average packs/day: 0.3 packs/day for 10.0 years (2.5 ttl pk-yrs)     Types: Cigarettes     Start date: 1975     Quit date: 1985     Years since quittin.7    Smokeless tobacco: Never   Vaping Use    Vaping status: Never Used   Substance and Sexual Activity    Alcohol use: No    Drug use: No    Sexual activity: Yes   Other Topics Concern    Not on file   Social History Narrative    Not on file     Social Determinants of Health     Financial Resource Strain: Low Risk  (2024)    Overall Financial Resource Strain (CARDIA)     Difficulty of Paying Living Expenses: Not hard at all   Food Insecurity: No Food Insecurity (2024)    Hunger Vital Sign     Worried About Running Out of Food in the Last Year: Never true     Ran Out of Food in the Last Year: Never true   Transportation Needs: Unknown (2024)    PRAPARE - Transportation     Lack of Transportation (Medical): Not on file     Lack of Transportation (Non-Medical): No   Physical Activity: Not on file   Stress: Not on file   Social Connections: Not on file   Intimate Partner Violence: Unknown (2024)    Received from  [facility]     Interpersonal Safety     Feel physically or emotionally unsafe where currently live: Not on file     Harm by anyone: Not on file     Emotionally Harmed: Not on file   Housing Stability: Unknown (9/13/2024)    Housing Stability Vital Sign     Unable to Pay for Housing in the Last Year: Not on file     Number of Times Moved in the Last Year: Not on file     Homeless in the Last Year: No       Vitals:    09/13/24 1034   BP: 122/70   Pulse: 64   SpO2: 97%   Weight: 109.3 kg (241 lb)      Estimated body mass index is 41.37 kg/m² as calculated from the following:    Height as of 9/11/24: 1.626 m (5' 4\").    Weight as of this encounter: 109.3 kg (241 lb).     Wt Readings from Last 2 Encounters:   09/13/24 109.3 kg (241 lb)   09/06/24 108 kg (238 lb)     BP Readings from Last 3 Encounters:   09/13/24 122/70   09/06/24 129/72   08/20/24 122/70      Physical Exam  Vitals reviewed.   Constitutional:       Appearance: Normal appearance.   Cardiovascular:      Rate and Rhythm: Normal rate and regular rhythm.      Pulses: Normal pulses.      Heart sounds: Normal heart sounds.   Pulmonary:      Effort: Pulmonary effort is normal.      Breath sounds: Normal breath sounds.   Abdominal:      General: Bowel sounds are normal.      Palpations: Abdomen is soft.   Musculoskeletal:      Right lower leg: No edema.      Left lower leg: No edema.   Neurological:      Mental Status: She is alert.        Lab Results   Component Value Date    HDL 59 01/15/2024     Lab Results   Component Value Date    LABA1C 5.8 01/15/2024     Lab Results   Component Value Date     01/15/2024    K 3.8 01/15/2024    BUN 16 01/15/2024    CREATININE 0.7 09/03/2024    LABGLOM >60 01/15/2024    GFRAA >60 07/10/2019    CALCIUM 9.6 01/15/2024    VITD25 28 (L) 07/16/2013     Lab Results   Component Value Date    ALT 12 01/15/2024    AST 20 01/15/2024     Lab Results   Component Value Date    HGB 13.2 07/07/2023     Lab Results    Component Value Date    TSH 1.09 07/10/2019            --Ban Hou DO on 9/13/2024 at 10:51 AM    An electronic signature was used to authenticate this note.

## 2024-09-18 NOTE — OP NOTE
Operative Note    Katerine Robertson  YOB: 1960  MRN: [id number]    PREOPERATIVE DIAGNOSIS  right axillary lymphadenopathy     POSTOPERATIVE DIAGNOSIS  right axillary lymphadenopathy    PROCEDURE  1. right excisional biopsy of axillary lymph node     ANESTHESIA  General anesthesia.    SURGEON  Mukesh Hou MD      ESTIMATED BLOOD LOSS  less than 50  ml    COMPLICATIONS  None apparent by completion of procedure    SPECIMENS REMOVED  1. right axillary lymph nodes     FINDINGS  The enlarged right axillary lymph node was identified and excised and sent fresh for lymphoma protocol    POST-OP CONDITION  Stable    DISPOSITION  To recovery room    INDICATION FOR PROCEDURE  Katerine Robertson is a 64 y.o. woman who was found to have an abnormality in her right axilla on imaging.   A core biopsy was performed and the pathology was benign but discordant so I recommended she undergo excision.  She presents today for that purpose. The indications for the planned procedure, along with the potential benefits and risks which include but are not limited to: anesthetic risk, bleeding, infection, wound complications, lymphedema, arm numbness, nerve injury. All questions were answered, and she agrees to proceed.    DESCRIPTION OF PROCEDURE   Katerine Robertson was brought to the operating room and placed supine on the operating room table with her arms extended on boards. She was appropriately positioned and padded. Bilateral sequential compression devices were applied to both lower extremities and a single dose of antibiotics was administered intravenously within 60 minutes prior to the incision time.      After induction of anesthesia, a timeout procedure was performed.  The patient was prepped and draped in the standard surgical fashion.  We directed our attention to the right axilla.  A curvilinear incision was made at the lower edge of the axillary hairline and carried down through the dermis with  electrocautery.  The subcutaneous tissues were opened and the clavipectoral fascia was incised.  Upon entering the axilla, palpation was utilized to guide excision of the enlarged lymph node.  The enlarged node was encountered and excised and then passed off the field.  Attention was then turned to the wound.  Aggressive hemostasis was obtained with electrocautery.  The wound was irrigated with copious amounts of sterile saline.  0.25% bupivacaine had been infiltrated into the soft tissues surrounding the cavity.  The deep dermal layer was then reapproximated with interrupted 3-0 Vicryl stitches.  The skin was reapproximated with a running subcuticular using 4-0 Monocryl.  Surgical glue dressing was applied.     The patient tolerated this procedure well, was awakened and transferred to the recovery room. The instrument, sponge, and needle counts were correct.  Her family was notified of intraoperative findings.    Electronically signed by Mukesh Hou MD on 9/20/24 at 12:43 PM EDT

## 2024-09-18 NOTE — DISCHARGE INSTRUCTIONS
Postoperative Instructions for Surgery  These are general guidelines to help assist in recovery after breast surgery. Please keep in mind all patients recover differently, so please call the office with any questions, [phone] ([phone]).    General guidelines   Please rest when you feel tired, although it is important to be up and about intermittently.  Wear a comfortable sports bra day and night for the next 48-72 hours to help alleviate any discomfort, as well as maintain pressure to avoid a postoperative seroma (fluid collection).  Blood thinning medications, such as warfarin or aspirin, can typically be resumed 48 hours after surgery. This should be discussed with your surgeon and prescribing physician.  If you had a sentinel lymph node procedure, it is common to have an interval of blue urine and/or stool and blue skin changes of the breast.  Final pathology will take 3-5 business days to result. The breast surgery office will call you with the results when they are known.    Pain management  You may feel mild to moderate discomfort when the local anesthesia wears off.  You may apply ice to your surgical site for 10 minutes at a time every hour while you are awake. Do not fall asleep with the ice in place  You may be given a prescription for a narcotic pain medication  Some patients experience very little discomfort and they prefer not to use the prescription  You may take Tylenol or extra strength Tylenol instead of the narcotic, but be aware that many pain medications also contain Tylenol so do not take both  AVOID aspirin, fish oil, Excedrin, Advil, Motrin, ibuprofen, and other NSAIDS immediately after surgery for at least 24 hours.  Narcotic pain medication may cause constipation. Make sure to keep well hydrated, ambulate, and eat high-fiber foods to help avoid constipation. You may use over-the-counter medications for constipation, such as colace or senna  You should not consume alcohol while taking

## 2024-09-20 ENCOUNTER — HOSPITAL ENCOUNTER (OUTPATIENT)
Age: 64
Setting detail: OUTPATIENT SURGERY
Discharge: HOME | End: 2024-09-20
Attending: STUDENT IN AN ORGANIZED HEALTH CARE EDUCATION/TRAINING PROGRAM | Admitting: STUDENT IN AN ORGANIZED HEALTH CARE EDUCATION/TRAINING PROGRAM
Payer: COMMERCIAL

## 2024-09-20 ENCOUNTER — ANESTHESIA EVENT (OUTPATIENT)
Dept: OPERATING ROOM | Age: 64
End: 2024-09-20
Payer: COMMERCIAL

## 2024-09-20 ENCOUNTER — ANESTHESIA (OUTPATIENT)
Dept: OPERATING ROOM | Age: 64
End: 2024-09-20
Payer: COMMERCIAL

## 2024-09-20 VITALS
WEIGHT: 240.8 LBS | HEIGHT: 64 IN | HEART RATE: 68 BPM | SYSTOLIC BLOOD PRESSURE: 140 MMHG | TEMPERATURE: 97 F | RESPIRATION RATE: 12 BRPM | DIASTOLIC BLOOD PRESSURE: 75 MMHG | OXYGEN SATURATION: 96 % | BODY MASS INDEX: 41.11 KG/M2

## 2024-09-20 DIAGNOSIS — C50.611 MALIGNANT NEOPLASM OF AXILLARY TAIL OF RIGHT FEMALE BREAST, UNSPECIFIED ESTROGEN RECEPTOR STATUS (HCC): ICD-10-CM

## 2024-09-20 DIAGNOSIS — R59.1 LYMPHADENOPATHY: Primary | ICD-10-CM

## 2024-09-20 LAB
GLUCOSE BLD-MCNC: 88 MG/DL (ref 70–99)
PERFORMED ON: NORMAL

## 2024-09-20 PROCEDURE — 2709999900 HC NON-CHARGEABLE SUPPLY: Performed by: STUDENT IN AN ORGANIZED HEALTH CARE EDUCATION/TRAINING PROGRAM

## 2024-09-20 PROCEDURE — 2580000003 HC RX 258: Performed by: ANESTHESIOLOGY

## 2024-09-20 PROCEDURE — 3600000004 HC SURGERY LEVEL 4 BASE: Performed by: STUDENT IN AN ORGANIZED HEALTH CARE EDUCATION/TRAINING PROGRAM

## 2024-09-20 PROCEDURE — 88185 FLOWCYTOMETRY/TC ADD-ON: CPT

## 2024-09-20 PROCEDURE — 6370000000 HC RX 637 (ALT 250 FOR IP): Performed by: STUDENT IN AN ORGANIZED HEALTH CARE EDUCATION/TRAINING PROGRAM

## 2024-09-20 PROCEDURE — 88342 IMHCHEM/IMCYTCHM 1ST ANTB: CPT

## 2024-09-20 PROCEDURE — 6360000002 HC RX W HCPCS: Performed by: STUDENT IN AN ORGANIZED HEALTH CARE EDUCATION/TRAINING PROGRAM

## 2024-09-20 PROCEDURE — 3600000014 HC SURGERY LEVEL 4 ADDTL 15MIN: Performed by: STUDENT IN AN ORGANIZED HEALTH CARE EDUCATION/TRAINING PROGRAM

## 2024-09-20 PROCEDURE — 2580000003 HC RX 258: Performed by: STUDENT IN AN ORGANIZED HEALTH CARE EDUCATION/TRAINING PROGRAM

## 2024-09-20 PROCEDURE — 7100000011 HC PHASE II RECOVERY - ADDTL 15 MIN: Performed by: STUDENT IN AN ORGANIZED HEALTH CARE EDUCATION/TRAINING PROGRAM

## 2024-09-20 PROCEDURE — 7100000010 HC PHASE II RECOVERY - FIRST 15 MIN: Performed by: STUDENT IN AN ORGANIZED HEALTH CARE EDUCATION/TRAINING PROGRAM

## 2024-09-20 PROCEDURE — 3700000001 HC ADD 15 MINUTES (ANESTHESIA): Performed by: STUDENT IN AN ORGANIZED HEALTH CARE EDUCATION/TRAINING PROGRAM

## 2024-09-20 PROCEDURE — A4217 STERILE WATER/SALINE, 500 ML: HCPCS | Performed by: STUDENT IN AN ORGANIZED HEALTH CARE EDUCATION/TRAINING PROGRAM

## 2024-09-20 PROCEDURE — 88341 IMHCHEM/IMCYTCHM EA ADD ANTB: CPT

## 2024-09-20 PROCEDURE — 88184 FLOWCYTOMETRY/ TC 1 MARKER: CPT

## 2024-09-20 PROCEDURE — 3700000000 HC ANESTHESIA ATTENDED CARE: Performed by: STUDENT IN AN ORGANIZED HEALTH CARE EDUCATION/TRAINING PROGRAM

## 2024-09-20 PROCEDURE — 6360000002 HC RX W HCPCS: Performed by: NURSE ANESTHETIST, CERTIFIED REGISTERED

## 2024-09-20 PROCEDURE — 7100000001 HC PACU RECOVERY - ADDTL 15 MIN: Performed by: STUDENT IN AN ORGANIZED HEALTH CARE EDUCATION/TRAINING PROGRAM

## 2024-09-20 PROCEDURE — 88305 TISSUE EXAM BY PATHOLOGIST: CPT

## 2024-09-20 PROCEDURE — 7100000000 HC PACU RECOVERY - FIRST 15 MIN: Performed by: STUDENT IN AN ORGANIZED HEALTH CARE EDUCATION/TRAINING PROGRAM

## 2024-09-20 RX ORDER — HYDRALAZINE HYDROCHLORIDE 20 MG/ML
10 INJECTION INTRAMUSCULAR; INTRAVENOUS
Status: DISCONTINUED | OUTPATIENT
Start: 2024-09-20 | End: 2024-09-20 | Stop reason: HOSPADM

## 2024-09-20 RX ORDER — MAGNESIUM HYDROXIDE 1200 MG/15ML
LIQUID ORAL CONTINUOUS PRN
Status: DISCONTINUED | OUTPATIENT
Start: 2024-09-20 | End: 2024-09-20 | Stop reason: HOSPADM

## 2024-09-20 RX ORDER — DIPHENHYDRAMINE HYDROCHLORIDE 50 MG/ML
12.5 INJECTION INTRAMUSCULAR; INTRAVENOUS
Status: DISCONTINUED | OUTPATIENT
Start: 2024-09-20 | End: 2024-09-20 | Stop reason: HOSPADM

## 2024-09-20 RX ORDER — PROPOFOL 10 MG/ML
INJECTION, EMULSION INTRAVENOUS
Status: DISCONTINUED | OUTPATIENT
Start: 2024-09-20 | End: 2024-09-20 | Stop reason: SDUPTHER

## 2024-09-20 RX ORDER — OXYCODONE HYDROCHLORIDE 5 MG/1
5 TABLET ORAL EVERY 6 HOURS PRN
Qty: 12 TABLET | Refills: 0 | Status: SHIPPED | OUTPATIENT
Start: 2024-09-20 | End: 2024-09-23

## 2024-09-20 RX ORDER — SODIUM CHLORIDE 0.9 % (FLUSH) 0.9 %
5-40 SYRINGE (ML) INJECTION EVERY 12 HOURS SCHEDULED
Status: DISCONTINUED | OUTPATIENT
Start: 2024-09-20 | End: 2024-09-20 | Stop reason: HOSPADM

## 2024-09-20 RX ORDER — METOCLOPRAMIDE HYDROCHLORIDE 5 MG/ML
10 INJECTION INTRAMUSCULAR; INTRAVENOUS
Status: DISCONTINUED | OUTPATIENT
Start: 2024-09-20 | End: 2024-09-20 | Stop reason: HOSPADM

## 2024-09-20 RX ORDER — LIDOCAINE HYDROCHLORIDE 20 MG/ML
INJECTION, SOLUTION INTRAVENOUS
Status: DISCONTINUED | OUTPATIENT
Start: 2024-09-20 | End: 2024-09-20 | Stop reason: SDUPTHER

## 2024-09-20 RX ORDER — SODIUM CHLORIDE 0.9 % (FLUSH) 0.9 %
5-40 SYRINGE (ML) INJECTION PRN
Status: DISCONTINUED | OUTPATIENT
Start: 2024-09-20 | End: 2024-09-20 | Stop reason: HOSPADM

## 2024-09-20 RX ORDER — MEPERIDINE HYDROCHLORIDE 25 MG/ML
12.5 INJECTION INTRAMUSCULAR; INTRAVENOUS; SUBCUTANEOUS EVERY 5 MIN PRN
Status: DISCONTINUED | OUTPATIENT
Start: 2024-09-20 | End: 2024-09-20 | Stop reason: HOSPADM

## 2024-09-20 RX ORDER — NALOXONE HYDROCHLORIDE 0.4 MG/ML
INJECTION, SOLUTION INTRAMUSCULAR; INTRAVENOUS; SUBCUTANEOUS PRN
Status: DISCONTINUED | OUTPATIENT
Start: 2024-09-20 | End: 2024-09-20 | Stop reason: HOSPADM

## 2024-09-20 RX ORDER — SODIUM CHLORIDE 9 MG/ML
INJECTION, SOLUTION INTRAVENOUS PRN
Status: DISCONTINUED | OUTPATIENT
Start: 2024-09-20 | End: 2024-09-20 | Stop reason: HOSPADM

## 2024-09-20 RX ORDER — ONDANSETRON 2 MG/ML
INJECTION INTRAMUSCULAR; INTRAVENOUS
Status: DISCONTINUED | OUTPATIENT
Start: 2024-09-20 | End: 2024-09-20 | Stop reason: SDUPTHER

## 2024-09-20 RX ORDER — BUPIVACAINE HYDROCHLORIDE 2.5 MG/ML
INJECTION, SOLUTION EPIDURAL; INFILTRATION; INTRACAUDAL PRN
Status: DISCONTINUED | OUTPATIENT
Start: 2024-09-20 | End: 2024-09-20 | Stop reason: HOSPADM

## 2024-09-20 RX ORDER — HYDROMORPHONE HYDROCHLORIDE 1 MG/ML
0.5 INJECTION, SOLUTION INTRAMUSCULAR; INTRAVENOUS; SUBCUTANEOUS EVERY 10 MIN PRN
Status: DISCONTINUED | OUTPATIENT
Start: 2024-09-20 | End: 2024-09-20 | Stop reason: HOSPADM

## 2024-09-20 RX ORDER — MIDAZOLAM HYDROCHLORIDE 1 MG/ML
INJECTION INTRAMUSCULAR; INTRAVENOUS
Status: DISCONTINUED | OUTPATIENT
Start: 2024-09-20 | End: 2024-09-20 | Stop reason: SDUPTHER

## 2024-09-20 RX ORDER — DEXAMETHASONE SODIUM PHOSPHATE 4 MG/ML
INJECTION, SOLUTION INTRA-ARTICULAR; INTRALESIONAL; INTRAMUSCULAR; INTRAVENOUS; SOFT TISSUE
Status: DISCONTINUED | OUTPATIENT
Start: 2024-09-20 | End: 2024-09-20 | Stop reason: SDUPTHER

## 2024-09-20 RX ORDER — FENTANYL CITRATE 50 UG/ML
INJECTION, SOLUTION INTRAMUSCULAR; INTRAVENOUS
Status: DISCONTINUED | OUTPATIENT
Start: 2024-09-20 | End: 2024-09-20 | Stop reason: SDUPTHER

## 2024-09-20 RX ORDER — OXYCODONE HYDROCHLORIDE 5 MG/1
5 TABLET ORAL ONCE
Status: COMPLETED | OUTPATIENT
Start: 2024-09-20 | End: 2024-09-20

## 2024-09-20 RX ORDER — SODIUM CHLORIDE, SODIUM LACTATE, POTASSIUM CHLORIDE, CALCIUM CHLORIDE 600; 310; 30; 20 MG/100ML; MG/100ML; MG/100ML; MG/100ML
INJECTION, SOLUTION INTRAVENOUS CONTINUOUS
Status: DISCONTINUED | OUTPATIENT
Start: 2024-09-20 | End: 2024-09-20 | Stop reason: HOSPADM

## 2024-09-20 RX ORDER — LABETALOL HYDROCHLORIDE 5 MG/ML
10 INJECTION, SOLUTION INTRAVENOUS
Status: DISCONTINUED | OUTPATIENT
Start: 2024-09-20 | End: 2024-09-20 | Stop reason: HOSPADM

## 2024-09-20 RX ORDER — HYDROMORPHONE HYDROCHLORIDE 1 MG/ML
0.5 INJECTION, SOLUTION INTRAMUSCULAR; INTRAVENOUS; SUBCUTANEOUS EVERY 5 MIN PRN
Status: DISCONTINUED | OUTPATIENT
Start: 2024-09-20 | End: 2024-09-20 | Stop reason: HOSPADM

## 2024-09-20 RX ADMIN — PROPOFOL 50 MG: 10 INJECTION, EMULSION INTRAVENOUS at 12:10

## 2024-09-20 RX ADMIN — MIDAZOLAM HYDROCHLORIDE 1 MG: 2 INJECTION, SOLUTION INTRAMUSCULAR; INTRAVENOUS at 12:05

## 2024-09-20 RX ADMIN — FENTANYL CITRATE 25 MCG: 50 INJECTION, SOLUTION INTRAMUSCULAR; INTRAVENOUS at 12:54

## 2024-09-20 RX ADMIN — PHENYLEPHRINE HYDROCHLORIDE 100 MCG: 10 INJECTION, SOLUTION INTRAVENOUS at 12:41

## 2024-09-20 RX ADMIN — PHENYLEPHRINE HYDROCHLORIDE 100 MCG: 10 INJECTION, SOLUTION INTRAVENOUS at 12:46

## 2024-09-20 RX ADMIN — FENTANYL CITRATE 25 MCG: 50 INJECTION, SOLUTION INTRAMUSCULAR; INTRAVENOUS at 12:36

## 2024-09-20 RX ADMIN — PHENYLEPHRINE HYDROCHLORIDE 100 MCG: 10 INJECTION, SOLUTION INTRAVENOUS at 12:36

## 2024-09-20 RX ADMIN — PHENYLEPHRINE HYDROCHLORIDE 100 MCG: 10 INJECTION, SOLUTION INTRAVENOUS at 12:22

## 2024-09-20 RX ADMIN — DEXAMETHASONE SODIUM PHOSPHATE 4 MG: 4 INJECTION INTRA-ARTICULAR; INTRALESIONAL; INTRAMUSCULAR; INTRAVENOUS; SOFT TISSUE at 12:14

## 2024-09-20 RX ADMIN — MIDAZOLAM HYDROCHLORIDE 1 MG: 2 INJECTION, SOLUTION INTRAMUSCULAR; INTRAVENOUS at 12:02

## 2024-09-20 RX ADMIN — FENTANYL CITRATE 50 MCG: 50 INJECTION, SOLUTION INTRAMUSCULAR; INTRAVENOUS at 12:09

## 2024-09-20 RX ADMIN — SODIUM CHLORIDE, POTASSIUM CHLORIDE, SODIUM LACTATE AND CALCIUM CHLORIDE: 600; 310; 30; 20 INJECTION, SOLUTION INTRAVENOUS at 10:51

## 2024-09-20 RX ADMIN — ONDANSETRON 4 MG: 2 INJECTION INTRAMUSCULAR; INTRAVENOUS at 12:14

## 2024-09-20 RX ADMIN — PROPOFOL 150 MG: 10 INJECTION, EMULSION INTRAVENOUS at 12:05

## 2024-09-20 RX ADMIN — WATER 2000 MG: 1 INJECTION INTRAMUSCULAR; INTRAVENOUS; SUBCUTANEOUS at 12:04

## 2024-09-20 RX ADMIN — PHENYLEPHRINE HYDROCHLORIDE 100 MCG: 10 INJECTION, SOLUTION INTRAVENOUS at 12:16

## 2024-09-20 RX ADMIN — LIDOCAINE HYDROCHLORIDE 100 MG: 20 INJECTION, SOLUTION INTRAVENOUS at 12:05

## 2024-09-20 RX ADMIN — OXYCODONE HYDROCHLORIDE 5 MG: 5 TABLET ORAL at 13:46

## 2024-09-20 RX ADMIN — PHENYLEPHRINE HYDROCHLORIDE 100 MCG: 10 INJECTION, SOLUTION INTRAVENOUS at 12:30

## 2024-09-20 ASSESSMENT — PAIN SCALES - GENERAL
PAINLEVEL_OUTOF10: 0
PAINLEVEL_OUTOF10: 4
PAINLEVEL_OUTOF10: 0

## 2024-09-20 ASSESSMENT — PAIN DESCRIPTION - ORIENTATION: ORIENTATION: RIGHT

## 2024-09-20 ASSESSMENT — PAIN DESCRIPTION - DESCRIPTORS: DESCRIPTORS: BURNING

## 2024-09-20 ASSESSMENT — PAIN DESCRIPTION - LOCATION: LOCATION: OTHER (COMMENT)

## 2024-09-20 NOTE — PROGRESS NOTES
Tolerated ice chips and sips of water, now drinking Pepsi and eating saltines with peanut butter and tolerating well.

## 2024-09-20 NOTE — INTERVAL H&P NOTE
Update History & Physical    The patient's History and Physical of September 13, 2024 was reviewed with the patient and I examined the patient. There was no change. The surgical site was confirmed by the patient and me.     Plan: The risks, benefits, expected outcome, and alternative to the recommended procedure have been discussed with the patient. Patient understands and wants to proceed with the procedure.     Electronically signed by Mukesh Hou MD on 9/20/2024 at 11:39 AM

## 2024-09-20 NOTE — PROGRESS NOTES
From OR drowsy, incision VENESSA with skin glue CDI, /83, other VSS.    Report from CRNA and OR RN.    S/P RIGHT AXILLARY LYMPH NODE EXCISIONAL BIOPSY

## 2024-09-20 NOTE — PROGRESS NOTES
Ambulatory Surgery/Procedure Discharge Note    Vitals:    09/20/24 1404   BP: (!) 140/75   Pulse: 68   Resp: 12   Temp: 97 °F (36.1 °C)   SpO2: 96%       Pt ready for discharge p[er Mustapha score    In: 330 [P.O.:240; I.V.:90]  Out: 10     Restroom use offered before discharge.  Yes    Pain assessment:  level of pain (1-10, 10 severe),   Pain Level: 0    Pt and S.O./family states \"ready to go home\". Pt alert and oriented x4. IV removed. Denies N/V or pain.  Pt tolerating po intake. Discharge instructions given to pt and wife with pt permission. Pt and wife verbalized understanding of all instructions. Left with all belongings,  and discharge instructions.     Patient discharged to home/self care. Patient discharged via wheel chair by transporter to waiting family/S.O.       9/20/2024 2:07 PM

## 2024-09-20 NOTE — PROGRESS NOTES
PACU Transfer Note    Vitals:    09/20/24 1346   BP: (!) 155/85   Pulse: 66   Resp: 14   Temp: 97.4 °F (36.3 °C)   SpO2: 99%     BP within 20% range.    In: 330 [P.O.:240; I.V.:90]  Out: 10     Pain assessment:    Pain Level: 4 Given Oxycodone.    Report given to Receiving unit RN.    Transported by [facility] transporter.    9/20/2024 2:00 PM

## 2024-09-25 NOTE — PROGRESS NOTES
Shanna Acosta (:  1960) is a 58 y.o. female,Established patient, here for evaluation of the following chief complaint(s):  Fever (Fever 102.0  x 5 days negative for Covid 23) and Cough (Productive cough/brown phlegm x 4 days followed by yellow phlegm x 1 day/Chest congestion)         ASSESSMENT/PLAN:    Tashia Bhatt was seen today for fever and cough. Diagnoses and all orders for this visit:    Pneumonia of left lung due to infectious organism, unspecified part of lung  -     azithromycin (ZITHROMAX) 250 MG tablet; Take 1 tablet by mouth See Admin Instructions for 5 days 500mg on day 1 followed by 250mg on days 2 - 5  Ricola lemon honey cough cough drops  Gargle with warm salt and water  Take all antibiotic until gone  Elevated BP without diagnosis of hypertension  -     Discontinue: chlorthalidone (HYGROTON) 25 MG tablet; .5 tablet every morning  -     chlorthalidone (HYGROTON) 25 MG tablet; .5 tablet every morning    Acute cough  -     XR CHEST LATERAL    Environmental and seasonal allergies   Take xyzal every night  Drink plenty of water             Subjective   SUBJECTIVE/OBJECTIVE:  HPI Presents today with complaints of severe cough with brown to yellow sputum and fever for last 3 days. Temperature 101-102 orally. Cough is moist and strong, causing headache    Review of Systems   Constitutional:  Positive for fatigue and fever. HENT:  Positive for postnasal drip and voice change. Eyes: Negative. Respiratory:  Positive for cough and shortness of breath. Endocrine: Negative. Genitourinary: Negative. Musculoskeletal: Negative. Skin: Negative. Allergic/Immunologic: Negative. Neurological:  Positive for headaches. Hematological: Negative. Psychiatric/Behavioral: Negative.         Vitals:    23 1408   BP: (!) 158/92   Pulse:    Temp:    SpO2:       BP Readings from Last 3 Encounters:   23 (!) 158/92   23 (!) 142/100   07/10/19 132/82      Wt Yes

## 2024-10-03 DIAGNOSIS — R03.0 ELEVATED BP WITHOUT DIAGNOSIS OF HYPERTENSION: ICD-10-CM

## 2024-10-07 RX ORDER — CHLORTHALIDONE 25 MG/1
25 TABLET ORAL DAILY
Qty: 90 TABLET | Refills: 3 | Status: SHIPPED | OUTPATIENT
Start: 2024-10-07

## 2024-12-02 ENCOUNTER — OFFICE VISIT (OUTPATIENT)
Dept: INTERNAL MEDICINE CLINIC | Age: 64
End: 2024-12-02

## 2024-12-02 VITALS
OXYGEN SATURATION: 99 % | BODY MASS INDEX: 41.01 KG/M2 | TEMPERATURE: 97.2 F | DIASTOLIC BLOOD PRESSURE: 86 MMHG | HEART RATE: 70 BPM | RESPIRATION RATE: 18 BRPM | WEIGHT: 240.2 LBS | HEIGHT: 64 IN | SYSTOLIC BLOOD PRESSURE: 118 MMHG

## 2024-12-02 DIAGNOSIS — E78.01 FAMILIAL HYPERCHOLESTEROLEMIA: ICD-10-CM

## 2024-12-02 DIAGNOSIS — I10 PRIMARY HYPERTENSION: ICD-10-CM

## 2024-12-02 DIAGNOSIS — M89.8X7 EXOSTOSIS OF LEFT FOOT: Primary | ICD-10-CM

## 2024-12-02 DIAGNOSIS — Z23 NEED FOR INFLUENZA VACCINATION: ICD-10-CM

## 2024-12-02 SDOH — ECONOMIC STABILITY: FOOD INSECURITY: WITHIN THE PAST 12 MONTHS, THE FOOD YOU BOUGHT JUST DIDN'T LAST AND YOU DIDN'T HAVE MONEY TO GET MORE.: NEVER TRUE

## 2024-12-02 SDOH — ECONOMIC STABILITY: INCOME INSECURITY: HOW HARD IS IT FOR YOU TO PAY FOR THE VERY BASICS LIKE FOOD, HOUSING, MEDICAL CARE, AND HEATING?: NOT HARD AT ALL

## 2024-12-02 SDOH — ECONOMIC STABILITY: FOOD INSECURITY: WITHIN THE PAST 12 MONTHS, YOU WORRIED THAT YOUR FOOD WOULD RUN OUT BEFORE YOU GOT MONEY TO BUY MORE.: NEVER TRUE

## 2024-12-02 ASSESSMENT — PATIENT HEALTH QUESTIONNAIRE - PHQ9
SUM OF ALL RESPONSES TO PHQ QUESTIONS 1-9: 0
1. LITTLE INTEREST OR PLEASURE IN DOING THINGS: NOT AT ALL
SUM OF ALL RESPONSES TO PHQ QUESTIONS 1-9: 0
2. FEELING DOWN, DEPRESSED OR HOPELESS: NOT AT ALL
SUM OF ALL RESPONSES TO PHQ9 QUESTIONS 1 & 2: 0

## 2024-12-02 ASSESSMENT — ANXIETY QUESTIONNAIRES
3. WORRYING TOO MUCH ABOUT DIFFERENT THINGS: NOT AT ALL
GAD7 TOTAL SCORE: 1
7. FEELING AFRAID AS IF SOMETHING AWFUL MIGHT HAPPEN: NOT AT ALL
2. NOT BEING ABLE TO STOP OR CONTROL WORRYING: NOT AT ALL
1. FEELING NERVOUS, ANXIOUS, OR ON EDGE: NOT AT ALL
IF YOU CHECKED OFF ANY PROBLEMS ON THIS QUESTIONNAIRE, HOW DIFFICULT HAVE THESE PROBLEMS MADE IT FOR YOU TO DO YOUR WORK, TAKE CARE OF THINGS AT HOME, OR GET ALONG WITH OTHER PEOPLE: NOT DIFFICULT AT ALL
4. TROUBLE RELAXING: SEVERAL DAYS
5. BEING SO RESTLESS THAT IT IS HARD TO SIT STILL: NOT AT ALL
6. BECOMING EASILY ANNOYED OR IRRITABLE: NOT AT ALL

## 2024-12-02 NOTE — PROGRESS NOTES
Preoperative Consultation      Katerine Robertson  YOB: 1960    Date of Service:  12/2/2024    Vitals:    12/02/24 0900   BP: 118/86   Pulse: 70   Resp: 18   Temp: 97.2 °F (36.2 °C)   SpO2: 99%   Weight: 109 kg (240 lb 3.2 oz)   Height: 1.626 m (5' 4\")      Wt Readings from Last 2 Encounters:   12/02/24 109 kg (240 lb 3.2 oz)   09/20/24 109.2 kg (240 lb 12.8 oz)     BP Readings from Last 3 Encounters:   12/02/24 118/86   09/20/24 (!) 140/75   09/13/24 122/70        Chief Complaint   Patient presents with    Pre-op Exam     12/13/2024 @ [facility] Left foot      No Known Allergies  No outpatient medications have been marked as taking for the 12/2/24 encounter (Office Visit) with Shay Hogue MD.       This patient presents to the office today for a preoperative consultation at the request of surgeon, Dr. Clinton Sheffield, who plans on performing exostectomy of the fifth metatarsal base  on December 13 at [facility].  The current problem began 1 year ago, and symptoms have been worsening with time.  Conservative therapy: No.    Planned anesthesia: None   Known anesthesia problems: None   Bleeding risk: No recent or remote history of abnormal bleeding  Personal or FH of DVT/PE: No    Patient objection to receiving blood products: No    Patient Active Problem List   Diagnosis    Obesity    Acute bronchitis    Knee pain    Elevated blood pressure reading    Mild ankle sprain    Nerve entrapment of lower limb    Peroneal tendinitis    Peroneal tendon tear    Conjunctivitis    Primary osteoarthritis of left knee       Past Medical History:   Diagnosis Date    Arthritis     left knee    Hypertension     Insulin resistance     Pre Diabetic    Obesity     Primary osteoarthritis of left knee 08/14/2017     Past Surgical History:   Procedure Laterality Date    AXILLARY SURGERY Right 9/20/2024    RIGHT AXILLARY LYMPH NODE EXCISIONAL BIOPSY performed by Savi

## 2024-12-02 NOTE — PATIENT INSTRUCTIONS
[facility] Housing Resources*  (Call [employer]/211 if need more resources.)      [place] [employer]   Speak to a trained professional 24/7 who can connect you to essential community services including food, clothing, transportation, housing, utilities, employment services, childcare, and baby supplies. 211 serves nationwide.  [facility].org for resources in [place], [facility], [place] and [facility] in [place]; [place], [place], [place], and [facility] in [place].   [employer]org/resources for resources in [place], [place], [place], [place], [place], [place], [place], [place], [facility], [place], [place], and [facility] in [place].      Homeless Crisis Line  Central Access Point (CAP Line): Intake system for families and individuals who are currently experiencing homelessness or who are at risk of becoming homeless. Can provide list of shelters and bed availability  Phone Number: [phone] ([phone]) Contact this number first.  It is a centralized point of entry for services.  Hours of Operation:  Monday - Friday 9am - 5pm; Saturday & Sunday 10pm - 2pm    [facility]  What they offer: 24-hour facility providing medical and nursing care to sick persons experiencing homelessness; assistance in breaking the cycle of homelessness.  Contact: [email or web address]     Homeless Shelters: [facility]: Women and Children  Located at [address] / Phone: [phone]  [facility]: Men   Located at [address] / Phone: [phone]  [facility]  Located at [address] / Phone: [phone]  [facility] [place] [facility]  [address] / Phone: [phone]    Homeless Shelters: [facility]: Emergency shelter in hotels for a stay of one month. The agency also has rental assistance funds for security deposits and

## 2024-12-11 ENCOUNTER — TELEPHONE (OUTPATIENT)
Dept: INTERNAL MEDICINE CLINIC | Age: 64
End: 2024-12-11

## 2024-12-11 NOTE — TELEPHONE ENCOUNTER
Left message to call office at [phone] or use [employer] to reschedule 3/10/24 appointment. Dr. Hogue being out of the office.

## 2025-02-06 ENCOUNTER — PATIENT MESSAGE (OUTPATIENT)
Dept: INTERNAL MEDICINE CLINIC | Age: 65
End: 2025-02-06

## 2025-02-06 DIAGNOSIS — E66.01 CLASS 3 SEVERE OBESITY DUE TO EXCESS CALORIES WITHOUT SERIOUS COMORBIDITY WITH BODY MASS INDEX (BMI) OF 40.0 TO 44.9 IN ADULT: Primary | ICD-10-CM

## 2025-02-06 DIAGNOSIS — E66.813 CLASS 3 SEVERE OBESITY DUE TO EXCESS CALORIES WITHOUT SERIOUS COMORBIDITY WITH BODY MASS INDEX (BMI) OF 40.0 TO 44.9 IN ADULT: Primary | ICD-10-CM

## 2025-03-06 DIAGNOSIS — E78.2 MODERATE MIXED HYPERLIPIDEMIA NOT REQUIRING STATIN THERAPY: ICD-10-CM

## 2025-03-06 DIAGNOSIS — I10 PRIMARY HYPERTENSION: ICD-10-CM

## 2025-03-06 RX ORDER — ERGOCALCIFEROL 1.25 MG/1
CAPSULE, LIQUID FILLED ORAL
Qty: 12 CAPSULE | Refills: 1 | Status: SHIPPED | OUTPATIENT
Start: 2025-03-06

## 2025-03-16 DIAGNOSIS — R03.0 ELEVATED BP WITHOUT DIAGNOSIS OF HYPERTENSION: ICD-10-CM

## 2025-03-16 DIAGNOSIS — E78.01 FAMILIAL HYPERCHOLESTEROLEMIA: ICD-10-CM

## 2025-03-17 RX ORDER — CHLORTHALIDONE 25 MG/1
25 TABLET ORAL DAILY
Qty: 90 TABLET | Refills: 1 | Status: SHIPPED | OUTPATIENT
Start: 2025-03-17

## 2025-03-17 RX ORDER — ROSUVASTATIN CALCIUM 10 MG/1
10 TABLET, COATED ORAL DAILY
Qty: 90 TABLET | Refills: 1 | Status: SHIPPED | OUTPATIENT
Start: 2025-03-17

## 2025-03-17 NOTE — TELEPHONE ENCOUNTER
Recent Visits  Date Type Provider Dept   12/02/24 Office Visit Shay Hogue MD [facility]   09/13/24 Office Visit Ban Hou DO [facility]   08/20/24 Office Visit Shay Houge MD [facility]   07/19/24 Office Visit Shay Hogue MD [facility]   04/18/24 Office Visit Shay Hogue MD [facility]   01/18/24 Office Visit Shay Hogue MD [facility]   10/03/23 Office Visit Ban Hou DO [facility]   Showing recent visits within past 540 days with a meds authorizing provider and meeting all other requirements  Future Appointments  Date Type Provider Dept   03/28/25 Appointment Shay Hogue MD [facility]   Showing future appointments within next 150 days with a meds authorizing provider and meeting all other requirements     12/2/2024

## 2025-03-17 NOTE — TELEPHONE ENCOUNTER
Recent Visits  Date Type Provider Dept   12/02/24 Office Visit Shay Hogue MD [facility]   09/13/24 Office Visit Ban Hou DO [facility]   08/20/24 Office Visit Shay Hogue MD [facility]   07/19/24 Office Visit Shay Hogue MD [facility]   04/18/24 Office Visit Shay Hogue MD [facility]   01/18/24 Office Visit Shay Hogue MD [facility]   10/03/23 Office Visit Ban Hou DO [facility]   Showing recent visits within past 540 days with a meds authorizing provider and meeting all other requirements  Future Appointments  Date Type Provider Dept   03/28/25 Appointment Shay Hogue MD [facility]   Showing future appointments within next 150 days with a meds authorizing provider and meeting all other requirements     12/2/2024

## 2025-03-21 DIAGNOSIS — E66.813 CLASS 3 SEVERE OBESITY DUE TO EXCESS CALORIES WITHOUT SERIOUS COMORBIDITY WITH BODY MASS INDEX (BMI) OF 40.0 TO 44.9 IN ADULT: ICD-10-CM

## 2025-03-21 DIAGNOSIS — E66.01 CLASS 3 SEVERE OBESITY DUE TO EXCESS CALORIES WITHOUT SERIOUS COMORBIDITY WITH BODY MASS INDEX (BMI) OF 40.0 TO 44.9 IN ADULT: ICD-10-CM

## 2025-03-21 ASSESSMENT — PATIENT HEALTH QUESTIONNAIRE - PHQ9
SUM OF ALL RESPONSES TO PHQ QUESTIONS 1-9: 2
1. LITTLE INTEREST OR PLEASURE IN DOING THINGS: SEVERAL DAYS
2. FEELING DOWN, DEPRESSED OR HOPELESS: SEVERAL DAYS
2. FEELING DOWN, DEPRESSED OR HOPELESS: SEVERAL DAYS
1. LITTLE INTEREST OR PLEASURE IN DOING THINGS: SEVERAL DAYS
SUM OF ALL RESPONSES TO PHQ QUESTIONS 1-9: 2
SUM OF ALL RESPONSES TO PHQ QUESTIONS 1-9: 2
SUM OF ALL RESPONSES TO PHQ9 QUESTIONS 1 & 2: 2
SUM OF ALL RESPONSES TO PHQ QUESTIONS 1-9: 2

## 2025-03-22 LAB
ALBUMIN SERPL-MCNC: 4.3 G/DL (ref 3.4–5)
ALBUMIN/GLOB SERPL: 1.5 {RATIO} (ref 1.1–2.2)
ALP SERPL-CCNC: 74 U/L (ref 40–129)
ALT SERPL-CCNC: 16 U/L (ref 10–40)
ANION GAP SERPL CALCULATED.3IONS-SCNC: 11 MMOL/L (ref 3–16)
AST SERPL-CCNC: 23 U/L (ref 15–37)
BILIRUB SERPL-MCNC: <0.2 MG/DL (ref 0–1)
BUN SERPL-MCNC: 12 MG/DL (ref 7–20)
CALCIUM SERPL-MCNC: 9.5 MG/DL (ref 8.3–10.6)
CHLORIDE SERPL-SCNC: 100 MMOL/L (ref 99–110)
CHOLEST SERPL-MCNC: 180 MG/DL (ref 0–199)
CO2 SERPL-SCNC: 31 MMOL/L (ref 21–32)
CREAT SERPL-MCNC: 0.8 MG/DL (ref 0.6–1.2)
EST. AVERAGE GLUCOSE BLD GHB EST-MCNC: 119.8 MG/DL
GFR SERPLBLD CREATININE-BSD FMLA CKD-EPI: 82 ML/MIN/{1.73_M2}
GLUCOSE SERPL-MCNC: 77 MG/DL (ref 70–99)
HBA1C MFR BLD: 5.8 %
HDLC SERPL-MCNC: 55 MG/DL (ref 40–60)
LDLC SERPL CALC-MCNC: 107 MG/DL
POTASSIUM SERPL-SCNC: 3.9 MMOL/L (ref 3.5–5.1)
PROT SERPL-MCNC: 7.2 G/DL (ref 6.4–8.2)
SODIUM SERPL-SCNC: 142 MMOL/L (ref 136–145)
TRIGL SERPL-MCNC: 90 MG/DL (ref 0–150)
VLDLC SERPL CALC-MCNC: 18 MG/DL

## 2025-03-24 ENCOUNTER — RESULTS FOLLOW-UP (OUTPATIENT)
Dept: INTERNAL MEDICINE CLINIC | Age: 65
End: 2025-03-24

## 2025-03-28 ENCOUNTER — OFFICE VISIT (OUTPATIENT)
Dept: INTERNAL MEDICINE CLINIC | Age: 65
End: 2025-03-28
Payer: COMMERCIAL

## 2025-03-28 VITALS
HEIGHT: 64 IN | SYSTOLIC BLOOD PRESSURE: 112 MMHG | TEMPERATURE: 97.8 F | HEART RATE: 73 BPM | DIASTOLIC BLOOD PRESSURE: 62 MMHG | BODY MASS INDEX: 41.66 KG/M2 | OXYGEN SATURATION: 98 % | WEIGHT: 244 LBS | RESPIRATION RATE: 18 BRPM

## 2025-03-28 DIAGNOSIS — E88.819 INSULIN RESISTANCE: ICD-10-CM

## 2025-03-28 DIAGNOSIS — E66.813 CLASS 3 SEVERE OBESITY DUE TO EXCESS CALORIES WITH SERIOUS COMORBIDITY AND BODY MASS INDEX (BMI) OF 40.0 TO 44.9 IN ADULT: ICD-10-CM

## 2025-03-28 DIAGNOSIS — Z00.00 WELL ADULT EXAM: Primary | ICD-10-CM

## 2025-03-28 DIAGNOSIS — E66.01 CLASS 3 SEVERE OBESITY DUE TO EXCESS CALORIES WITH SERIOUS COMORBIDITY AND BODY MASS INDEX (BMI) OF 40.0 TO 44.9 IN ADULT: ICD-10-CM

## 2025-03-28 DIAGNOSIS — L83 ACANTHOSIS: ICD-10-CM

## 2025-03-28 DIAGNOSIS — I10 PRIMARY HYPERTENSION: ICD-10-CM

## 2025-03-28 DIAGNOSIS — E78.2 MODERATE MIXED HYPERLIPIDEMIA NOT REQUIRING STATIN THERAPY: ICD-10-CM

## 2025-03-28 DIAGNOSIS — E88.810 METABOLIC SYNDROME: ICD-10-CM

## 2025-03-28 PROCEDURE — 3074F SYST BP LT 130 MM HG: CPT | Performed by: INTERNAL MEDICINE

## 2025-03-28 PROCEDURE — 3078F DIAST BP <80 MM HG: CPT | Performed by: INTERNAL MEDICINE

## 2025-03-28 PROCEDURE — 99396 PREV VISIT EST AGE 40-64: CPT | Performed by: INTERNAL MEDICINE

## 2025-03-28 SDOH — ECONOMIC STABILITY: FOOD INSECURITY: WITHIN THE PAST 12 MONTHS, YOU WORRIED THAT YOUR FOOD WOULD RUN OUT BEFORE YOU GOT MONEY TO BUY MORE.: NEVER TRUE

## 2025-03-28 SDOH — ECONOMIC STABILITY: FOOD INSECURITY: WITHIN THE PAST 12 MONTHS, THE FOOD YOU BOUGHT JUST DIDN'T LAST AND YOU DIDN'T HAVE MONEY TO GET MORE.: NEVER TRUE

## 2025-03-28 ASSESSMENT — ANXIETY QUESTIONNAIRES
GAD7 TOTAL SCORE: 3
3. WORRYING TOO MUCH ABOUT DIFFERENT THINGS: MORE THAN HALF THE DAYS
1. FEELING NERVOUS, ANXIOUS, OR ON EDGE: NOT AT ALL
IF YOU CHECKED OFF ANY PROBLEMS ON THIS QUESTIONNAIRE, HOW DIFFICULT HAVE THESE PROBLEMS MADE IT FOR YOU TO DO YOUR WORK, TAKE CARE OF THINGS AT HOME, OR GET ALONG WITH OTHER PEOPLE: NOT DIFFICULT AT ALL
2. NOT BEING ABLE TO STOP OR CONTROL WORRYING: NOT AT ALL
7. FEELING AFRAID AS IF SOMETHING AWFUL MIGHT HAPPEN: NOT AT ALL
5. BEING SO RESTLESS THAT IT IS HARD TO SIT STILL: NOT AT ALL
6. BECOMING EASILY ANNOYED OR IRRITABLE: NOT AT ALL
4. TROUBLE RELAXING: SEVERAL DAYS

## 2025-03-28 NOTE — ASSESSMENT & PLAN NOTE
Chronic, at goal (stable), continue current treatment plan    Orders:    Vitamin D 25 Hydroxy; Future    CBC with Auto Differential; Future    Comprehensive Metabolic Panel; Future

## 2025-03-28 NOTE — PROGRESS NOTES
3/28/2025    Katerine Robertson (:  1960) is a 64 y.o. female, here for a preventive medicine evaluation.  Patient would like to lose weight.  She is on rybelus but it is not helping. She would like to try some  Moujaro.      Subjective   Patient Active Problem List   Diagnosis    Obesity    Acute bronchitis    Knee pain    Elevated blood pressure reading    Mild ankle sprain    Nerve entrapment of lower limb    Peroneal tendinitis    Peroneal tendon tear    Conjunctivitis    Primary osteoarthritis of left knee    Moderate mixed hyperlipidemia not requiring statin therapy    Primary hypertension    Insulin resistance     Patient has been to the dentist within the past year.     Patient does not text and drive    Patient wears seatbelt when driving    Review of Systems    Prior to Visit Medications    Medication Sig Taking? Authorizing Provider   Tirzepatide 2.5 MG/0.5ML SOAJ Inject 2.5 mg into the skin every 7 days Yes Shay Hogue MD   chlorthalidone (HYGROTON) 25 MG tablet Take 1 tablet by mouth daily  Shay Hogue MD   rosuvastatin (CRESTOR) 10 MG tablet Take 1 tablet by mouth daily  Shay Hogeu MD   vitamin D (ERGOCALCIFEROL) 1.25 MG (16884 UT) CAPS capsule TAKE 1 CAPSULE BY MOUTH 1 TIME A WEEK AT 5 PM  Shay Hogue MD        No Known Allergies    Past Medical History:   Diagnosis Date    Arthritis     left knee    Hypertension     Insulin resistance     Pre Diabetic    Obesity     Primary osteoarthritis of left knee 2017       Past Surgical History:   Procedure Laterality Date    AXILLARY SURGERY Right 2024    RIGHT AXILLARY LYMPH NODE EXCISIONAL BIOPSY performed by Mukesh Hou MD at [facility] OR    CARDIAC SURGERY       SECTION      x2    EYE SURGERY Bilateral     glaucoma surgery    FOOT FRACTURE SURGERY Left 2017    Peroneus longus tendon repair, left. Peroneus brevis tendon repair, left.Peroneus brevis & longus tenodesis, left

## 2025-03-28 NOTE — ASSESSMENT & PLAN NOTE
Chronic, not at goal (unstable), trial of tirzepitide    Orders:    INSULIN, RANDOM; Future    Tirzepatide 2.5 MG/0.5ML SOAJ; Inject 2.5 mg into the skin every 7 days

## 2025-04-07 ENCOUNTER — PATIENT MESSAGE (OUTPATIENT)
Dept: INTERNAL MEDICINE CLINIC | Age: 65
End: 2025-04-07

## 2025-04-07 ENCOUNTER — HOSPITAL ENCOUNTER (OUTPATIENT)
Dept: ULTRASOUND IMAGING | Age: 65
Discharge: HOME | End: 2025-04-07
Payer: COMMERCIAL

## 2025-04-07 DIAGNOSIS — E66.813 CLASS 3 SEVERE OBESITY DUE TO EXCESS CALORIES WITH BODY MASS INDEX (BMI) OF 40.0 TO 44.9 IN ADULT, UNSPECIFIED WHETHER SERIOUS COMORBIDITY PRESENT: Primary | ICD-10-CM

## 2025-04-07 DIAGNOSIS — I10 PRIMARY HYPERTENSION: ICD-10-CM

## 2025-04-07 DIAGNOSIS — I88.9 AXILLARY LYMPHADENITIS: ICD-10-CM

## 2025-04-07 PROCEDURE — 76882 US LMTD JT/FCL EVL NVASC XTR: CPT

## 2025-04-09 ENCOUNTER — TELEPHONE (OUTPATIENT)
Dept: ADMINISTRATIVE | Age: 65
End: 2025-04-09

## 2025-04-09 NOTE — TELEPHONE ENCOUNTER
Submitted PA for Mounjaro 5MG/0.5ML auto-injectors  Via CMM Key: [id number] STATUS: not sent    \"No eligibility was found. Please reconfirm the member's health plan coverage before re-submitting.\" Per [employer].    Please contact patient and verify pharmacy benefit and demographics. Please advise.    If this requires a response please respond to the pool. ([facility] Pre-Auth).    Please advise patient thank you.

## 2025-04-11 NOTE — TELEPHONE ENCOUNTER
Submitted PA for Mounjaro 2.5mg/0.5ml auto inj, via Phone Call to [facility]. Reference # [id number] Status: PENDING REVIEW.    Follow up done daily; if no response in three days we will refax for status check.  If another three days goes by with no response we will call the insurance for status.

## 2025-04-14 NOTE — TELEPHONE ENCOUNTER
The medication was DENIED; DENIAL letter is uploaded to MEDIA.    Generic Denial: Drug is not covered for off label usage.  This drug is FDA approved for : and  Other; please see Denial Letter.     Note :        If you want an APPEAL; please note in this encounter what new information you would like to APPEAL with.  Once complete route back to PA POOL.    If this requires a response please respond to the pool ( [facility] PRE-AUTH).      Thank you please advise patient.

## 2025-05-05 ENCOUNTER — PATIENT MESSAGE (OUTPATIENT)
Dept: INTERNAL MEDICINE CLINIC | Age: 65
End: 2025-05-05

## 2025-05-05 DIAGNOSIS — E66.813 CLASS 3 SEVERE OBESITY DUE TO EXCESS CALORIES WITHOUT SERIOUS COMORBIDITY WITH BODY MASS INDEX (BMI) OF 40.0 TO 44.9 IN ADULT (HCC): ICD-10-CM

## 2025-05-05 DIAGNOSIS — E53.8 VITAMIN B 12 DEFICIENCY: Primary | ICD-10-CM

## 2025-05-29 ENCOUNTER — PATIENT MESSAGE (OUTPATIENT)
Dept: INTERNAL MEDICINE CLINIC | Age: 65
End: 2025-05-29

## 2025-06-06 DIAGNOSIS — E66.813 CLASS 3 SEVERE OBESITY DUE TO EXCESS CALORIES WITHOUT SERIOUS COMORBIDITY WITH BODY MASS INDEX (BMI) OF 40.0 TO 44.9 IN ADULT (HCC): ICD-10-CM

## 2025-06-06 DIAGNOSIS — E53.8 VITAMIN B 12 DEFICIENCY: ICD-10-CM

## 2025-06-09 ENCOUNTER — PATIENT MESSAGE (OUTPATIENT)
Dept: INTERNAL MEDICINE CLINIC | Age: 65
End: 2025-06-09

## 2025-06-09 DIAGNOSIS — E53.8 VITAMIN B 12 DEFICIENCY: Primary | ICD-10-CM

## 2025-07-03 ENCOUNTER — E-VISIT (OUTPATIENT)
Dept: INTERNAL MEDICINE CLINIC | Age: 65
End: 2025-07-03

## 2025-07-03 DIAGNOSIS — F17.200 ENCOUNTER FOR SCREENING FOR MALIGNANT NEOPLASM OF LUNG IN CURRENT SMOKER WITH 30 PACK YEAR HISTORY OR GREATER: Primary | ICD-10-CM

## 2025-07-03 DIAGNOSIS — Z12.2 ENCOUNTER FOR SCREENING FOR MALIGNANT NEOPLASM OF LUNG IN CURRENT SMOKER WITH 30 PACK YEAR HISTORY OR GREATER: Primary | ICD-10-CM

## 2025-07-03 PROCEDURE — 99999 PR OFFICE/OUTPT VISIT,PROCEDURE ONLY: CPT | Performed by: INTERNAL MEDICINE

## 2025-07-06 ENCOUNTER — PATIENT MESSAGE (OUTPATIENT)
Dept: INTERNAL MEDICINE CLINIC | Age: 65
End: 2025-07-06

## 2025-07-08 NOTE — TELEPHONE ENCOUNTER
Left pt a vm to return the call to the office, Dr Hogue is out of the office until Friday please offer pt a vv with a different provider if that is ok

## 2025-07-21 DIAGNOSIS — L83 ACANTHOSIS: ICD-10-CM

## 2025-07-21 DIAGNOSIS — I10 PRIMARY HYPERTENSION: ICD-10-CM

## 2025-07-21 DIAGNOSIS — E88.819 INSULIN RESISTANCE: ICD-10-CM

## 2025-07-21 DIAGNOSIS — E78.2 MODERATE MIXED HYPERLIPIDEMIA NOT REQUIRING STATIN THERAPY: ICD-10-CM

## 2025-07-21 DIAGNOSIS — E88.810 METABOLIC SYNDROME: ICD-10-CM

## 2025-07-22 LAB
25(OH)D3 SERPL-MCNC: 42.2 NG/ML
ALBUMIN SERPL-MCNC: 3.9 G/DL (ref 3.4–5)
ALBUMIN/GLOB SERPL: 1.3 {RATIO} (ref 1.1–2.2)
ALP SERPL-CCNC: 70 U/L (ref 40–129)
ALT SERPL-CCNC: 15 U/L (ref 10–40)
ANION GAP SERPL CALCULATED.3IONS-SCNC: 13 MMOL/L (ref 3–16)
AST SERPL-CCNC: 22 U/L (ref 15–37)
BASOPHILS # BLD: 0 K/UL (ref 0–0.2)
BASOPHILS NFR BLD: 0.8 %
BILIRUB SERPL-MCNC: 0.4 MG/DL (ref 0–1)
BUN SERPL-MCNC: 13 MG/DL (ref 7–20)
CALCIUM SERPL-MCNC: 9.4 MG/DL (ref 8.3–10.6)
CHLORIDE SERPL-SCNC: 100 MMOL/L (ref 99–110)
CHOLEST SERPL-MCNC: 157 MG/DL (ref 0–199)
CO2 SERPL-SCNC: 29 MMOL/L (ref 21–32)
CREAT SERPL-MCNC: 0.7 MG/DL (ref 0.6–1.2)
DEPRECATED RDW RBC AUTO: 14.8 % (ref 12.4–15.4)
EOSINOPHIL # BLD: 0.1 K/UL (ref 0–0.6)
EOSINOPHIL NFR BLD: 1.8 %
EST. AVERAGE GLUCOSE BLD GHB EST-MCNC: 125.5 MG/DL
GFR SERPLBLD CREATININE-BSD FMLA CKD-EPI: >90 ML/MIN/{1.73_M2}
GLUCOSE SERPL-MCNC: 84 MG/DL (ref 70–99)
HBA1C MFR BLD: 6 %
HCT VFR BLD AUTO: 37.5 % (ref 36–48)
HDLC SERPL-MCNC: 48 MG/DL (ref 40–60)
HGB BLD-MCNC: 12.6 G/DL (ref 12–16)
LDLC SERPL CALC-MCNC: 91 MG/DL
LYMPHOCYTES # BLD: 1.9 K/UL (ref 1–5.1)
LYMPHOCYTES NFR BLD: 32.5 %
MCH RBC QN AUTO: 26.1 PG (ref 26–34)
MCHC RBC AUTO-ENTMCNC: 33.6 G/DL (ref 31–36)
MCV RBC AUTO: 77.7 FL (ref 80–100)
MONOCYTES # BLD: 0.6 K/UL (ref 0–1.3)
MONOCYTES NFR BLD: 9.5 %
NEUTROPHILS # BLD: 3.3 K/UL (ref 1.7–7.7)
NEUTROPHILS NFR BLD: 55.4 %
PLATELET # BLD AUTO: 221 K/UL (ref 135–450)
PMV BLD AUTO: 8.4 FL (ref 5–10.5)
POTASSIUM SERPL-SCNC: 3.5 MMOL/L (ref 3.5–5.1)
PROT SERPL-MCNC: 6.8 G/DL (ref 6.4–8.2)
RBC # BLD AUTO: 4.83 M/UL (ref 4–5.2)
SODIUM SERPL-SCNC: 142 MMOL/L (ref 136–145)
TRIGL SERPL-MCNC: 90 MG/DL (ref 0–150)
VLDLC SERPL CALC-MCNC: 18 MG/DL
WBC # BLD AUTO: 5.9 K/UL (ref 4–11)

## 2025-07-23 LAB — INSULIN SERPL-ACNC: 17 UIU/ML

## 2025-08-01 ENCOUNTER — OFFICE VISIT (OUTPATIENT)
Dept: INTERNAL MEDICINE CLINIC | Age: 65
End: 2025-08-01
Payer: COMMERCIAL

## 2025-08-01 VITALS
WEIGHT: 249 LBS | DIASTOLIC BLOOD PRESSURE: 72 MMHG | OXYGEN SATURATION: 98 % | BODY MASS INDEX: 42.74 KG/M2 | SYSTOLIC BLOOD PRESSURE: 98 MMHG | HEART RATE: 77 BPM

## 2025-08-01 DIAGNOSIS — M25.562 CHRONIC PAIN OF LEFT KNEE: ICD-10-CM

## 2025-08-01 DIAGNOSIS — G89.29 CHRONIC PAIN OF LEFT KNEE: ICD-10-CM

## 2025-08-01 DIAGNOSIS — R06.83 SNORING: ICD-10-CM

## 2025-08-01 DIAGNOSIS — E53.8 VITAMIN B12 DEFICIENCY: ICD-10-CM

## 2025-08-01 DIAGNOSIS — R40.0 DAYTIME SOMNOLENCE: ICD-10-CM

## 2025-08-01 DIAGNOSIS — E88.819 INSULIN RESISTANCE: Primary | ICD-10-CM

## 2025-08-01 PROCEDURE — 3078F DIAST BP <80 MM HG: CPT | Performed by: INTERNAL MEDICINE

## 2025-08-01 PROCEDURE — G2211 COMPLEX E/M VISIT ADD ON: HCPCS | Performed by: INTERNAL MEDICINE

## 2025-08-01 PROCEDURE — 3074F SYST BP LT 130 MM HG: CPT | Performed by: INTERNAL MEDICINE

## 2025-08-01 PROCEDURE — 99214 OFFICE O/P EST MOD 30 MIN: CPT | Performed by: INTERNAL MEDICINE

## 2025-08-01 NOTE — ASSESSMENT & PLAN NOTE
stable    Orders:    Semaglutide-Weight Management (WEGOVY) 2.4 MG/0.75ML SOAJ SC injection; Inject 2.4 mg into the skin every 7 days Please compound with vitamin b12 for vitamin b12 deficiency, patient specific dosing.

## 2025-08-01 NOTE — ASSESSMENT & PLAN NOTE
Worsening pain.    Orders:    Jesse Nichols MD, Orthopedics and Sports Medicine (Knee; Shoulder), [facility]

## 2025-08-01 NOTE — PROGRESS NOTES
Katerine Robertson (:  1960) is a 64 y.o. female,Established patient, here for evaluation of the following chief complaint(s):  Weight Management         Assessment & Plan  Insulin resistance   stable    Orders:    Semaglutide-Weight Management (WEGOVY) 2.4 MG/0.75ML SOAJ SC injection; Inject 2.4 mg into the skin every 7 days Please compound with vitamin b12 for vitamin b12 deficiency, patient specific dosing.    Vitamin B12 deficiency   stable    Orders:    Semaglutide-Weight Management (WEGOVY) 2.4 MG/0.75ML SOAJ SC injection; Inject 2.4 mg into the skin every 7 days Please compound with vitamin b12 for vitamin b12 deficiency, patient specific dosing.    Snoring   Refer to sleep medicine    Orders:    Shay Payton MD, Sleep Medicine, [facility]    Daytime somnolence   stable    Orders:    Semaglutide-Weight Management (WEGOVY) 2.4 MG/0.75ML SOAJ SC injection; Inject 2.4 mg into the skin every 7 days Please compound with vitamin b12 for vitamin b12 deficiency, patient specific dosing.    Chronic pain of left knee   Worsening pain.    Orders:    Jesse Nichols MD, Orthopedics and Sports Medicine (Knee; Shoulder), [facility]      Return in about 4 months (around 2025).       Subjective   HPI  Patient comes in for weight loss. She has been taking compounded semaglutide. She states that  The medication is not reducing her appetite. She cannot afford the branded version. She is struggling  With her sleep. She reports significant snoring and getting up to go to the bathroom multiple times at night.  She has significantly interrupted sleep.  She thinks this may be impacting her ability to lose weight.  We discussed that she may need a sleep study to evaluate for sleep apnea.  She was okay with getting a referral to sleep medicine.    Review of Systems       Objective   Vitals:    25 1304   BP: 98/72   BP Site: Right Upper Arm   Patient Position: Sitting   BP Cuff

## 2025-08-08 ENCOUNTER — OFFICE VISIT (OUTPATIENT)
Dept: ORTHOPEDIC SURGERY | Age: 65
End: 2025-08-08

## 2025-08-08 VITALS — HEIGHT: 64 IN | BODY MASS INDEX: 42.51 KG/M2 | WEIGHT: 249 LBS

## 2025-08-08 DIAGNOSIS — M25.562 LEFT KNEE PAIN, UNSPECIFIED CHRONICITY: Primary | ICD-10-CM

## 2025-08-08 DIAGNOSIS — M17.12 PRIMARY OSTEOARTHRITIS OF LEFT KNEE: ICD-10-CM

## 2025-08-08 RX ORDER — ROPIVACAINE HYDROCHLORIDE 5 MG/ML
30 INJECTION, SOLUTION EPIDURAL; INFILTRATION; PERINEURAL ONCE
Status: COMPLETED | OUTPATIENT
Start: 2025-08-08 | End: 2025-08-08

## 2025-08-08 RX ORDER — TRIAMCINOLONE ACETONIDE 40 MG/ML
40 INJECTION, SUSPENSION INTRA-ARTICULAR; INTRAMUSCULAR ONCE
Status: COMPLETED | OUTPATIENT
Start: 2025-08-08 | End: 2025-08-08

## 2025-08-08 RX ADMIN — ROPIVACAINE HYDROCHLORIDE 30 ML: 5 INJECTION, SOLUTION EPIDURAL; INFILTRATION; PERINEURAL at 15:13

## 2025-08-08 RX ADMIN — TRIAMCINOLONE ACETONIDE 40 MG: 40 INJECTION, SUSPENSION INTRA-ARTICULAR; INTRAMUSCULAR at 15:14

## 2025-08-13 ENCOUNTER — HOSPITAL ENCOUNTER (OUTPATIENT)
Dept: PHYSICAL THERAPY | Age: 65
Setting detail: THERAPIES SERIES
Discharge: HOME | End: 2025-08-13
Payer: COMMERCIAL

## 2025-08-13 DIAGNOSIS — M25.562 LEFT KNEE PAIN, UNSPECIFIED CHRONICITY: Primary | ICD-10-CM

## 2025-08-13 PROCEDURE — 97161 PT EVAL LOW COMPLEX 20 MIN: CPT

## 2025-08-13 PROCEDURE — 97112 NEUROMUSCULAR REEDUCATION: CPT

## 2025-08-18 ENCOUNTER — OFFICE VISIT (OUTPATIENT)
Age: 65
End: 2025-08-18
Payer: COMMERCIAL

## 2025-08-18 VITALS
OXYGEN SATURATION: 100 % | DIASTOLIC BLOOD PRESSURE: 68 MMHG | HEART RATE: 75 BPM | BODY MASS INDEX: 41.03 KG/M2 | HEIGHT: 64 IN | SYSTOLIC BLOOD PRESSURE: 118 MMHG | WEIGHT: 240.3 LBS

## 2025-08-18 DIAGNOSIS — E66.01 MORBID OBESITY WITH BMI OF 40.0-44.9, ADULT (HCC): ICD-10-CM

## 2025-08-18 DIAGNOSIS — G47.10 HYPERSOMNIA: Primary | ICD-10-CM

## 2025-08-18 DIAGNOSIS — I10 PRIMARY HYPERTENSION: ICD-10-CM

## 2025-08-18 DIAGNOSIS — G47.00 INSOMNIA, UNSPECIFIED TYPE: ICD-10-CM

## 2025-08-18 DIAGNOSIS — R06.83 SNORING: ICD-10-CM

## 2025-08-18 PROCEDURE — 3074F SYST BP LT 130 MM HG: CPT | Performed by: STUDENT IN AN ORGANIZED HEALTH CARE EDUCATION/TRAINING PROGRAM

## 2025-08-18 PROCEDURE — 3078F DIAST BP <80 MM HG: CPT | Performed by: STUDENT IN AN ORGANIZED HEALTH CARE EDUCATION/TRAINING PROGRAM

## 2025-08-18 PROCEDURE — 99204 OFFICE O/P NEW MOD 45 MIN: CPT | Performed by: STUDENT IN AN ORGANIZED HEALTH CARE EDUCATION/TRAINING PROGRAM

## 2025-08-18 ASSESSMENT — SLEEP AND FATIGUE QUESTIONNAIRES
HOW LIKELY ARE YOU TO NOD OFF OR FALL ASLEEP WHILE SITTING AND READING: HIGH CHANCE OF DOZING
ESS TOTAL SCORE: 11
HOW LIKELY ARE YOU TO NOD OFF OR FALL ASLEEP WHILE LYING DOWN TO REST IN THE AFTERNOON WHEN CIRCUMSTANCES PERMIT: WOULD NEVER DOZE
HOW LIKELY ARE YOU TO NOD OFF OR FALL ASLEEP WHILE SITTING INACTIVE IN A PUBLIC PLACE: MODERATE CHANCE OF DOZING
HOW LIKELY ARE YOU TO NOD OFF OR FALL ASLEEP IN A CAR, WHILE STOPPED FOR A FEW MINUTES IN TRAFFIC: SLIGHT CHANCE OF DOZING
HOW LIKELY ARE YOU TO NOD OFF OR FALL ASLEEP WHILE SITTING AND TALKING TO SOMEONE: WOULD NEVER DOZE
HOW LIKELY ARE YOU TO NOD OFF OR FALL ASLEEP WHILE WATCHING TV: MODERATE CHANCE OF DOZING
HOW LIKELY ARE YOU TO NOD OFF OR FALL ASLEEP WHILE SITTING QUIETLY AFTER LUNCH WITHOUT ALCOHOL: WOULD NEVER DOZE
HOW LIKELY ARE YOU TO NOD OFF OR FALL ASLEEP WHEN YOU ARE A PASSENGER IN A CAR FOR AN HOUR WITHOUT A BREAK: HIGH CHANCE OF DOZING

## 2025-08-18 ASSESSMENT — LIFESTYLE VARIABLES
HOW OFTEN DO YOU HAVE A DRINK CONTAINING ALCOHOL: NEVER
HOW MANY STANDARD DRINKS CONTAINING ALCOHOL DO YOU HAVE ON A TYPICAL DAY: PATIENT DOES NOT DRINK

## 2025-08-19 ENCOUNTER — TELEPHONE (OUTPATIENT)
Dept: SLEEP CENTER | Age: 65
End: 2025-08-19

## 2025-08-20 ENCOUNTER — HOSPITAL ENCOUNTER (OUTPATIENT)
Dept: PHYSICAL THERAPY | Age: 65
Setting detail: THERAPIES SERIES
Discharge: HOME | End: 2025-08-20
Payer: COMMERCIAL

## 2025-08-20 PROCEDURE — 97110 THERAPEUTIC EXERCISES: CPT

## 2025-08-20 PROCEDURE — 97112 NEUROMUSCULAR REEDUCATION: CPT

## 2025-08-25 ENCOUNTER — APPOINTMENT (OUTPATIENT)
Dept: PHYSICAL THERAPY | Age: 65
End: 2025-08-25
Payer: COMMERCIAL

## 2025-09-01 ENCOUNTER — PATIENT MESSAGE (OUTPATIENT)
Dept: INTERNAL MEDICINE CLINIC | Age: 65
End: 2025-09-01

## 2025-09-03 ENCOUNTER — HOSPITAL ENCOUNTER (OUTPATIENT)
Dept: PHYSICAL THERAPY | Age: 65
Setting detail: THERAPIES SERIES
Discharge: HOME | End: 2025-09-03
Payer: COMMERCIAL

## 2025-09-03 PROCEDURE — 97112 NEUROMUSCULAR REEDUCATION: CPT

## 2025-09-03 PROCEDURE — 97110 THERAPEUTIC EXERCISES: CPT

## 2025-09-03 RX ORDER — DICLOFENAC SODIUM 75 MG/1
75 TABLET, DELAYED RELEASE ORAL 2 TIMES DAILY
Qty: 60 TABLET | Refills: 3 | Status: SHIPPED | OUTPATIENT
Start: 2025-09-03

## (undated) DEVICE — DRAPE,LAPAROTOMY,PED,STERILE: Brand: MEDLINE

## (undated) DEVICE — APPLICATOR MEDICATED 26 CC SOLUTION HI LT ORNG CHLORAPREP

## (undated) DEVICE — SHEET,DRAPE,40X58,STERILE: Brand: MEDLINE

## (undated) DEVICE — DRAPE,UTILTY,TAPE,15X26, 4EA/PK: Brand: MEDLINE

## (undated) DEVICE — GLOVE SURG SZ 75 L12IN FNGR THK79MIL GRN LTX FREE

## (undated) DEVICE — SUTURE MONOCRYL + SZ 4-0 L27IN ABSRB UD L19MM PS-2 3/8 CIR MCP426H

## (undated) DEVICE — LIQUIBAND RAPID ADHESIVE 36/CS 0.8ML: Brand: MEDLINE

## (undated) DEVICE — TOWEL,STOP FLAG GOLD N-W: Brand: MEDLINE

## (undated) DEVICE — CLIP LIG M BLU TI HRT SHP WIRE HORZ 24 CLIPS/PK 25PK/CA

## (undated) DEVICE — PROVE COVER: Brand: UNBRANDED

## (undated) DEVICE — TRAP FLUID

## (undated) DEVICE — SUTURE PERMA-HAND SZ 2-0 L30IN NONABSORBABLE BLK L26MM SH K833H

## (undated) DEVICE — SUTURE VICRYL + SZ 3-0 L27IN ABSRB UD L26MM SH 1/2 CIR VCP416H

## (undated) DEVICE — PACK,UNIVERSAL,NO GOWNS: Brand: MEDLINE

## (undated) DEVICE — GAUZE FLUFF 1 PLY: Brand: DEROYAL

## (undated) DEVICE — GLOVE SURG SZ 7 CRM LTX FREE POLYISOPRENE POLYMER BEAD ANTI

## (undated) DEVICE — MINOR BREAST: Brand: MEDLINE INDUSTRIES, INC.

## (undated) DEVICE — NEPTUNE E-SEP SMOKE EVACUATION PENCIL, COATED, 70MM BLADE, PUSH BUTTON SWITCH: Brand: NEPTUNE E-SEP

## (undated) DEVICE — YANKAUER,OPEN TIP,W/O VENT,STERILE: Brand: MEDLINE INDUSTRIES, INC.

## (undated) DEVICE — GOWN,SIRUS,POLYRNF,BRTHSLV,LG,30/CS: Brand: MEDLINE

## (undated) DEVICE — GLOVE SURG SZ 65 THK91MIL LTX FREE SYN POLYISOPRENE